# Patient Record
Sex: MALE | Race: WHITE | NOT HISPANIC OR LATINO | Employment: OTHER | URBAN - NONMETROPOLITAN AREA
[De-identification: names, ages, dates, MRNs, and addresses within clinical notes are randomized per-mention and may not be internally consistent; named-entity substitution may affect disease eponyms.]

---

## 2021-07-20 NOTE — PROGRESS NOTES
[facility] - PODIATRY    Today's Date: 07/28/21    Patient Name: Haseeb Joshua  MRN: [id number]  CSN: [id number]  PCP: Daniel Bennett DO  Referring Provider: No ref. provider found    SUBJECTIVE     Chief Complaint   Patient presents with   • Establish Care     Darkening of the left great toenail.  Pt states that the nail is also thick.  Unable to get a pulse ox reading. Pt has no pain.     HPI: Haseeb Joshua, a 75 y.o.male, comes to clinic as a(n) new patient presenting for diabetic foot exam and complaining of painful toenails. Patient has h/o CKD, diabetes, DVT, hypertension, hypothyroidism, CVA. Patient is IDDM and unsure of last BG level. States that blood glucose has typically been running less than 150. Presents with complaints of thickened, irregular toenail growth of the left hallux nail. Also notes that the left second nail is thick and irregular and that his wife is unable to cut it due to thickness. Denies any significant pain related to irregular toenail growth. Denies prior treatment. Denies pain. Denies previous treatment. Denies any constitutional symptoms. No other pedal complaints at this time.    Past Medical History:   Diagnosis Date   • Chronic kidney disease     stage 3   • Diabetes mellitus (CMS/HCC)    • DVT (deep venous thrombosis) (CMS/HCC)    • Hypertension    • Hypothyroidism    • Stroke (CMS/HCC) 09/09/2016    PCA distribution stroke, left     History reviewed. No pertinent surgical history.  History reviewed. No pertinent family history.  Social History     Socioeconomic History   • Marital status:      Spouse name: Not on file   • Number of children: Not on file   • Years of education: Not on file   • Highest education level: Not on file   Tobacco Use   • Smoking status: Never Smoker   • Smokeless tobacco: Never Used   Substance and Sexual Activity   • Alcohol use: No   • Drug use: No     No Known Allergies  Current Outpatient Medications   Medication Sig  Dispense Refill   • ACCU-CHEK ANITRA PLUS test strip      • atorvastatin (LIPITOR) 10 MG tablet Take 10 mg by mouth daily.     • insulin glargine (LANTUS, SEMGLEE) 100 UNIT/ML injection Inject  under the skin into the appropriate area as directed Daily.     • levothyroxine (SYNTHROID, LEVOTHROID) 100 MCG tablet Take 100 mcg by mouth daily.     • losartan (COZAAR) 100 MG tablet      • NovoLOG FlexPen 100 UNIT/ML solution pen-injector sc pen INJECT BEFORE MEALS 3 TIMES A DAY BASED ON SLIDING SCALE 5 10 UNITS.     • rivaroxaban (XARELTO) 20 MG tablet Take 20 mg by mouth daily.     • Arginine (PURE L-CITRULLINE) 600 MG capsule Take 600 mg by mouth daily.     • Arginine 500 MG capsule Take 500 mg by mouth 2 (two) times a day.     • ARIPiprazole (ABILIFY) 2 MG tablet      • calcitriol (ROCALTROL) 0.25 MCG capsule Take 0.25 mcg by mouth daily.     • glimepiride (AMARYL) 4 MG tablet Take 4 mg by mouth 2 (two) times a day.     • lisinopril (PRINIVIL,ZESTRIL) 20 MG tablet Take 20 mg by mouth daily.     • lovastatin (MEVACOR) 20 MG tablet      • mirtazapine (REMERON) 15 MG tablet        No current facility-administered medications for this visit.     Review of Systems   Constitutional: Negative for chills and fever.   HENT: Negative for congestion.    Respiratory: Negative for shortness of breath.    Cardiovascular: Negative for chest pain and leg swelling.   Gastrointestinal: Negative for constipation, diarrhea, nausea and vomiting.   Musculoskeletal: Positive for gait problem. Negative for arthralgias and myalgias.   Skin: Negative for wound.   Neurological: Negative for numbness.       OBJECTIVE     Vitals:    07/28/21 1050   BP: 114/68       PHYSICAL EXAM  GEN:   Accompanied by spouse     Foot/Ankle Exam:       General:   Diabetic Foot Exam Performed    Appearance: appears stated age and healthy    Orientation: AAOx3    Affect: appropriate    Gait: unimpaired    Assistance: independent    Shoe Gear:  Casual  shoes    VASCULAR      Right Foot Vascularity   Dorsalis pedis:  2+  Posterior tibial:  2+  Skin Temperature: warm    Edema Grading:  None  CFT:  3  Pedal Hair Growth:  Present  Varicosities: none       Left Foot Vascularity   Dorsalis pedis:  2+  Posterior tibial:  2+  Skin Temperature: warm    Edema Grading:  None  CFT:  3  Pedal Hair Growth:  Present  Varicosities: none        NEUROLOGIC     Right Foot Neurologic   Normal sensation    Light touch sensation:  Normal  Vibratory sensation:  Normal  Hot/Cold sensation: normal    Protective Sensation using [place]-Shelbi Monofilament:  10     Left Foot Neurologic   Normal sensation    Light touch sensation:  Normal  Vibratory sensation:  Normal  Hot/cold sensation: normal    Protective Sensation using [place]-Shelbi Monofilament:  10     MUSCULOSKELETAL      Right Foot Musculoskeletal   Ecchymosis:  None  Tenderness: none    Arch:  Normal     Left Foot Musculoskeletal   Ecchymosis:  None  Tenderness: none    Arch:  Normal     MUSCLE STRENGTH     Right Foot Muscle Strength   Foot dorsiflexion:  5  Foot plantar flexion:  5  Foot inversion:  5  Foot eversion:  5     Left Foot Muscle Strength   Foot dorsiflexion:  5  Foot plantar flexion:  5  Foot inversion:  5  Foot eversion:  5     RANGE OF MOTION      Right Foot Range of Motion   Foot and ankle ROM within normal limits       Left Foot Range of Motion   Foot and ankle ROM within normal limits       DERMATOLOGIC     Right Foot Dermatologic   Skin: skin intact       Left Foot Dermatologic   Skin: skin intact    Nails: onychomycosis and abnormally thick    Nails comment:  Onychogryphosis of hallux      RADIOLOGY/NUCLEAR:  No results found.    LABORATORY/CULTURE RESULTS:      PATHOLOGY RESULTS:       ASSESSMENT/PLAN     Diagnoses and all orders for this visit:    1. Onychogryphosis (Primary)    2. Type 2 diabetes mellitus without complication, with long-term current use of insulin (CMS/[facility])      Comprehensive lower  extremity examination and evaluation was performed.  Discussed findings and treatment plan including risks, benefits, and treatment options with patient in detail. Patient agreed with treatment plan.  After verbal consent obtained, nail(s) x2 debrided of length and thickness with nail nipper without incidence  Patient may maintain nails and calluses at home utilizing emery board or pumice stone between visits as needed  Reviewed at home diabetic foot care including daily foot checks   We will follow at a minimum on an annual basis for diabetic foot monitoring. Advised patient and spouse that if there are changes in foot health in the future including change in neuropathy that we would be able to follow on a more frequent basis for nail care and monitoring.  An After Visit Summary was printed and given to the patient at discharge, including (if requested) any available informative/educational handouts regarding diagnosis, treatment, or medications. All questions were answered to patient/family satisfaction. Should symptoms fail to improve or worsen they agree to call or return to clinic or to go to the Emergency Department. Discussed the importance of following up with any needed screening tests/labs/specialist appointments and any requested follow-up recommended by me today. Importance of maintaining follow-up discussed and patient accepts that missed appointments can delay diagnosis and potentially lead to worsening of conditions.  Return in about 1 year (around 7/28/2022)., or sooner if acute issues arise.        This document has been electronically signed by SANDRA Osuna on July 28, 2021 12:46 CDT

## 2021-07-27 ENCOUNTER — TELEPHONE (OUTPATIENT)
Dept: PODIATRY | Facility: CLINIC | Age: 76
End: 2021-07-27

## 2021-07-27 NOTE — TELEPHONE ENCOUNTER
Left message reminding patient of appointment with Allan Kennedy tomorrow.  Advised of time and location.

## 2021-07-28 ENCOUNTER — OFFICE VISIT (OUTPATIENT)
Dept: PODIATRY | Facility: CLINIC | Age: 76
End: 2021-07-28

## 2021-07-28 VITALS
DIASTOLIC BLOOD PRESSURE: 68 MMHG | BODY MASS INDEX: 28.76 KG/M2 | HEIGHT: 73 IN | SYSTOLIC BLOOD PRESSURE: 114 MMHG | WEIGHT: 217 LBS

## 2021-07-28 DIAGNOSIS — E11.9 TYPE 2 DIABETES MELLITUS WITHOUT COMPLICATION, WITH LONG-TERM CURRENT USE OF INSULIN (HCC): ICD-10-CM

## 2021-07-28 DIAGNOSIS — L60.2 ONYCHOGRYPHOSIS: Primary | ICD-10-CM

## 2021-07-28 DIAGNOSIS — Z79.4 TYPE 2 DIABETES MELLITUS WITHOUT COMPLICATION, WITH LONG-TERM CURRENT USE OF INSULIN (HCC): ICD-10-CM

## 2021-07-28 PROCEDURE — 99203 OFFICE O/P NEW LOW 30 MIN: CPT | Performed by: NURSE PRACTITIONER

## 2021-07-28 PROCEDURE — 11720 DEBRIDE NAIL 1-5: CPT | Performed by: NURSE PRACTITIONER

## 2021-07-28 RX ORDER — INSULIN GLARGINE 100 [IU]/ML
INJECTION, SOLUTION SUBCUTANEOUS DAILY
COMMUNITY

## 2021-07-28 RX ORDER — INSULIN ASPART 100 [IU]/ML
INJECTION, SOLUTION INTRAVENOUS; SUBCUTANEOUS
COMMUNITY
Start: 2021-05-28

## 2021-07-28 RX ORDER — LOSARTAN POTASSIUM 100 MG/1
TABLET ORAL
COMMUNITY
Start: 2021-05-27

## 2021-09-21 ENCOUNTER — HOSPITAL ENCOUNTER (OUTPATIENT)
Dept: LAB | Age: 76
Discharge: HOME | End: 2021-09-21
Payer: COMMERCIAL

## 2021-09-21 LAB
ALBUMIN SERPL-MCNC: 4.2 G/DL (ref 3.5–5.2)
ALP BLD-CCNC: 75 U/L (ref 40–130)
ALT SERPL-CCNC: 13 U/L (ref 5–41)
ANION GAP SERPL CALCULATED.3IONS-SCNC: 11 MMOL/L (ref 7–19)
AST SERPL-CCNC: 26 U/L (ref 5–40)
BASOPHILS ABSOLUTE: 0 K/UL (ref 0–0.2)
BASOPHILS RELATIVE PERCENT: 1 % (ref 0–1)
BILIRUB SERPL-MCNC: 0.5 MG/DL (ref 0.2–1.2)
BUN BLDV-MCNC: 53 MG/DL (ref 8–23)
CALCIUM SERPL-MCNC: 9.6 MG/DL (ref 8.8–10.2)
CHLORIDE BLD-SCNC: 108 MMOL/L (ref 98–111)
CO2: 22 MMOL/L (ref 22–29)
CREAT SERPL-MCNC: 2 MG/DL (ref 0.5–1.2)
CREATININE URINE: 88.6 MG/DL (ref 4.2–622)
EOSINOPHILS ABSOLUTE: 0.2 K/UL (ref 0–0.6)
EOSINOPHILS RELATIVE PERCENT: 4.5 % (ref 0–5)
GFR AFRICAN AMERICAN: 40
GFR NON-AFRICAN AMERICAN: 33
GLUCOSE BLD-MCNC: 108 MG/DL (ref 74–109)
HBA1C MFR BLD: 6.5 % (ref 4–6)
HCT VFR BLD CALC: 42.6 % (ref 42–52)
HEMOGLOBIN: 13.3 G/DL (ref 14–18)
IMMATURE GRANULOCYTES #: 0 K/UL
LYMPHOCYTES ABSOLUTE: 1 K/UL (ref 1.1–4.5)
LYMPHOCYTES RELATIVE PERCENT: 23.5 % (ref 20–40)
MCH RBC QN AUTO: 32.1 PG (ref 27–31)
MCHC RBC AUTO-ENTMCNC: 31.2 G/DL (ref 33–37)
MCV RBC AUTO: 102.9 FL (ref 80–94)
MICROALBUMIN UR-MCNC: <1.2 MG/DL (ref 0–19)
MICROALBUMIN/CREAT UR-RTO: NORMAL MG/G
MONOCYTES ABSOLUTE: 0.4 K/UL (ref 0–0.9)
MONOCYTES RELATIVE PERCENT: 9.5 % (ref 0–10)
NEUTROPHILS ABSOLUTE: 2.6 K/UL (ref 1.5–7.5)
NEUTROPHILS RELATIVE PERCENT: 61.3 % (ref 50–65)
PDW BLD-RTO: 12.3 % (ref 11.5–14.5)
PLATELET # BLD: 159 K/UL (ref 130–400)
PMV BLD AUTO: 11.3 FL (ref 9.4–12.4)
POTASSIUM SERPL-SCNC: 4.8 MMOL/L (ref 3.5–5)
RBC # BLD: 4.14 M/UL (ref 4.7–6.1)
SODIUM BLD-SCNC: 141 MMOL/L (ref 136–145)
TOTAL PROTEIN: 6.5 G/DL (ref 6.6–8.7)
VITAMIN B-12: 739 PG/ML (ref 211–946)
WBC # BLD: 4.2 K/UL (ref 4.8–10.8)

## 2021-09-22 LAB
SEX HORMONE BINDING GLOBULIN: 78 NMOL/L (ref 11–80)
TESTOSTERONE FREE PERCENT: 0.9 % (ref 1.6–2.9)
TESTOSTERONE FREE, CALC: 2 PG/ML (ref 47–244)
TESTOSTERONE TOTAL-MALE: 23 NG/DL (ref 300–720)

## 2022-05-12 ENCOUNTER — TRANSCRIBE ORDERS (OUTPATIENT)
Dept: ADMINISTRATIVE | Facility: HOSPITAL | Age: 77
End: 2022-05-12

## 2022-05-12 ENCOUNTER — LAB (OUTPATIENT)
Dept: LAB | Facility: HOSPITAL | Age: 77
End: 2022-05-12

## 2022-05-12 DIAGNOSIS — I69.30 SEQUELAE OF CEREBRAL INFARCTION: ICD-10-CM

## 2022-05-12 DIAGNOSIS — I51.9 MYXEDEMA HEART DISEASE: ICD-10-CM

## 2022-05-12 DIAGNOSIS — E03.9 MYXEDEMA HEART DISEASE: ICD-10-CM

## 2022-05-12 DIAGNOSIS — E11.40 DIABETIC NEUROPATHY WITH NEUROLOGIC COMPLICATION: ICD-10-CM

## 2022-05-12 DIAGNOSIS — Q53.10 UNILATERAL UNDESCENDED TESTICLE, UNSPECIFIED LOCATION: ICD-10-CM

## 2022-05-12 DIAGNOSIS — E11.49 DIABETIC NEUROPATHY WITH NEUROLOGIC COMPLICATION: ICD-10-CM

## 2022-05-12 DIAGNOSIS — I69.30 SEQUELAE OF CEREBRAL INFARCTION: Primary | ICD-10-CM

## 2022-05-12 DIAGNOSIS — E78.5 HYPERLIPIDEMIA, UNSPECIFIED HYPERLIPIDEMIA TYPE: ICD-10-CM

## 2022-05-12 LAB
ALBUMIN SERPL-MCNC: 4.2 G/DL (ref 3.5–5)
ALBUMIN/GLOB SERPL: 1.5 G/DL (ref 1.1–2.5)
ALP SERPL-CCNC: 62 U/L (ref 24–120)
ALT SERPL W P-5'-P-CCNC: 18 U/L (ref 0–50)
ANION GAP SERPL CALCULATED.3IONS-SCNC: 3 MMOL/L (ref 4–13)
AST SERPL-CCNC: 23 U/L (ref 7–45)
AUTO MIXED CELLS #: 0.6 10*3/MM3 (ref 0.1–2.6)
AUTO MIXED CELLS %: 14.5 % (ref 0.1–24)
BILIRUB SERPL-MCNC: 0.5 MG/DL (ref 0.1–1)
BUN SERPL-MCNC: 53 MG/DL (ref 5–21)
BUN/CREAT SERPL: 26
CALCIUM SPEC-SCNC: 9.5 MG/DL (ref 8.4–10.4)
CHLORIDE SERPL-SCNC: 112 MMOL/L (ref 98–110)
CHOLEST SERPL-MCNC: 122 MG/DL (ref 130–200)
CO2 SERPL-SCNC: 26 MMOL/L (ref 24–31)
CREAT SERPL-MCNC: 2.04 MG/DL (ref 0.5–1.4)
EGFRCR SERPLBLD CKD-EPI 2021: 33.4 ML/MIN/1.73
ERYTHROCYTE [DISTWIDTH] IN BLOOD BY AUTOMATED COUNT: 12.9 % (ref 12.3–15.4)
GLOBULIN UR ELPH-MCNC: 2.8 GM/DL
GLUCOSE SERPL-MCNC: 148 MG/DL (ref 70–100)
HBA1C MFR BLD: 6.5 % (ref 4.8–5.9)
HCT VFR BLD AUTO: 39.3 % (ref 37.5–51)
HDLC SERPL-MCNC: 64 MG/DL
HGB BLD-MCNC: 13.1 G/DL (ref 13–17.7)
LDLC SERPL CALC-MCNC: 44 MG/DL (ref 0–99)
LDLC/HDLC SERPL: 0.7 {RATIO}
LYMPHOCYTES # BLD AUTO: 1 10*3/MM3 (ref 0.7–3.1)
LYMPHOCYTES NFR BLD AUTO: 22.7 % (ref 19.6–45.3)
MCH RBC QN AUTO: 32.8 PG (ref 26.6–33)
MCHC RBC AUTO-ENTMCNC: 33.3 G/DL (ref 31.5–35.7)
MCV RBC AUTO: 98.5 FL (ref 79–97)
NEUTROPHILS NFR BLD AUTO: 2.8 10*3/MM3 (ref 1.7–7)
NEUTROPHILS NFR BLD AUTO: 62.8 % (ref 42.7–76)
PLATELET # BLD AUTO: 146 10*3/MM3 (ref 140–450)
PMV BLD AUTO: 9.5 FL (ref 6–12)
POTASSIUM SERPL-SCNC: 4.8 MMOL/L (ref 3.5–5.3)
PROT SERPL-MCNC: 7 G/DL (ref 6.3–8.7)
RBC # BLD AUTO: 3.99 10*6/MM3 (ref 4.14–5.8)
SODIUM SERPL-SCNC: 141 MMOL/L (ref 135–145)
TESTOST SERPL-MCNC: 157 NG/DL (ref 193–740)
TRIGL SERPL-MCNC: 65 MG/DL (ref 0–149)
TSH SERPL DL<=0.05 MIU/L-ACNC: 1.61 UIU/ML (ref 0.27–4.2)
VLDLC SERPL-MCNC: 14 MG/DL (ref 5–40)
WBC NRBC COR # BLD: 4.4 10*3/MM3 (ref 3.4–10.8)

## 2022-05-12 PROCEDURE — 82043 UR ALBUMIN QUANTITATIVE: CPT

## 2022-05-12 PROCEDURE — 85025 COMPLETE CBC W/AUTO DIFF WBC: CPT

## 2022-05-12 PROCEDURE — 80053 COMPREHEN METABOLIC PANEL: CPT | Performed by: INTERNAL MEDICINE

## 2022-05-12 PROCEDURE — 84443 ASSAY THYROID STIM HORMONE: CPT

## 2022-05-12 PROCEDURE — 36415 COLL VENOUS BLD VENIPUNCTURE: CPT | Performed by: INTERNAL MEDICINE

## 2022-05-12 PROCEDURE — 83036 HEMOGLOBIN GLYCOSYLATED A1C: CPT | Performed by: INTERNAL MEDICINE

## 2022-05-12 PROCEDURE — 84403 ASSAY OF TOTAL TESTOSTERONE: CPT

## 2022-05-12 PROCEDURE — 80061 LIPID PANEL: CPT

## 2022-05-12 PROCEDURE — 82570 ASSAY OF URINE CREATININE: CPT

## 2022-05-13 LAB
ALBUMIN UR-MCNC: 1.6 MG/DL
CREAT UR-MCNC: 124.8 MG/DL
MICROALBUMIN/CREAT UR: 12.8 MG/G

## 2022-07-25 NOTE — PROGRESS NOTES
[facility] - PODIATRY    Today's Date: 07/28/22    Patient Name: Haseeb Joshua  MRN: [id number]  CSN: [id number]  PCP: Daniel Bennett DO  Referring Provider: No ref. provider found    SUBJECTIVE     Chief Complaint   Patient presents with   • Follow-up      Daniel Bennett DO PCP05/12/2022 1 yr fu - pt states doing ok, no complaints - pt denies pain - pt presents 1 year follow up with yellow discolored nails, short thick discolored nails, cracking of  the left hallux    • Diabetes     170mg/dl this am      HPI: Haseeb Joshua, a 76 y.o.male, comes to clinic as a(n) established patient presenting for diabetic foot exam and complaining of painful toenails. Patient has h/o CKD, diabetes, DVT, hypertension, hypothyroidism, CVA. Patient is IDDM with last stated BG level of 170mg/dl. Presents with complaints of thickened, irregular toenail growth of nails of both feet. Denies any significant pain related to irregular toenail growth. Denies pain. Relates previous treatment(s) including care by podiatry. Denies any constitutional symptoms. No other pedal complaints at this time.    Past Medical History:   Diagnosis Date   • Chronic kidney disease     stage 3   • Diabetes mellitus (HCC)    • DVT (deep venous thrombosis) (HCC)    • Hypertension    • Hypothyroidism    • Stroke (HCC) 09/09/2016    PCA distribution stroke, left     History reviewed. No pertinent surgical history.  History reviewed. No pertinent family history.  Social History     Socioeconomic History   • Marital status:    Tobacco Use   • Smoking status: Never Smoker   • Smokeless tobacco: Never Used   Substance and Sexual Activity   • Alcohol use: No   • Drug use: No     No Known Allergies  Current Outpatient Medications   Medication Sig Dispense Refill   • ACCU-CHEK ANITRA PLUS test strip      • Arginine 600 MG capsule Take 600 mg by mouth daily.     • ARIPiprazole (ABILIFY) 2 MG tablet      • atorvastatin (LIPITOR) 10 MG tablet Take  10 mg by mouth daily.     • calcitriol (ROCALTROL) 0.25 MCG capsule Take 0.25 mcg by mouth daily.     • glimepiride (AMARYL) 4 MG tablet Take 4 mg by mouth 2 (two) times a day.     • insulin glargine (LANTUS, SEMGLEE) 100 UNIT/ML injection Inject  under the skin into the appropriate area as directed Daily.     • levothyroxine (SYNTHROID, LEVOTHROID) 100 MCG tablet Take 100 mcg by mouth daily.     • lisinopril (PRINIVIL,ZESTRIL) 20 MG tablet Take 20 mg by mouth daily.     • losartan (COZAAR) 100 MG tablet      • lovastatin (MEVACOR) 20 MG tablet      • mirtazapine (REMERON) 15 MG tablet      • NovoLOG FlexPen 100 UNIT/ML solution pen-injector sc pen      • rivaroxaban (XARELTO) 20 MG tablet Take 20 mg by mouth daily.     • Arginine 500 MG capsule Take 500 mg by mouth 2 (two) times a day.       No current facility-administered medications for this visit.     Review of Systems   Constitutional: Negative for chills and fever.   HENT: Negative for congestion.    Respiratory: Negative for shortness of breath.    Cardiovascular: Negative for chest pain and leg swelling.   Gastrointestinal: Negative for constipation, diarrhea, nausea and vomiting.   Musculoskeletal: Positive for gait problem. Negative for arthralgias and myalgias.   Skin: Negative for wound.        Thickened nails   Neurological: Negative for numbness.       OBJECTIVE     Vitals:    07/27/22 1109   BP: 120/70       PHYSICAL EXAM  GEN:   Accompanied by spouse     Foot/Ankle Exam:       General:   Diabetic Foot Exam Performed    Appearance: appears stated age and healthy    Orientation: AAOx3    Affect: appropriate    Gait: unimpaired    Assistance: independent    Shoe Gear:  Casual shoes    VASCULAR      Right Foot Vascularity   Dorsalis pedis:  2+  Posterior tibial:  2+  Skin Temperature: warm    Edema Grading:  None  CFT:  3  Pedal Hair Growth:  Present  Varicosities: none       Left Foot Vascularity   Dorsalis pedis:  2+  Posterior tibial:  2+  Skin  Temperature: warm    Edema Grading:  None  CFT:  3  Pedal Hair Growth:  Present  Varicosities: none        NEUROLOGIC     Right Foot Neurologic   Normal sensation    Light touch sensation:  Normal  Vibratory sensation:  Normal  Hot/Cold sensation: normal    Protective Sensation using [place]-Shelbi Monofilament:  10     Left Foot Neurologic   Normal sensation    Light touch sensation:  Normal  Vibratory sensation:  Normal  Hot/cold sensation: normal    Protective Sensation using [place]-Shelbi Monofilament:  10     MUSCULOSKELETAL      Right Foot Musculoskeletal   Ecchymosis:  None  Tenderness: none    Arch:  Normal     Left Foot Musculoskeletal   Ecchymosis:  None  Tenderness: none    Arch:  Normal     MUSCLE STRENGTH     Right Foot Muscle Strength   Foot dorsiflexion:  5  Foot plantar flexion:  5  Foot inversion:  5  Foot eversion:  5     Left Foot Muscle Strength   Foot dorsiflexion:  5  Foot plantar flexion:  5  Foot inversion:  5  Foot eversion:  5     RANGE OF MOTION      Right Foot Range of Motion   Foot and ankle ROM within normal limits       Left Foot Range of Motion   Foot and ankle ROM within normal limits       DERMATOLOGIC     Right Foot Dermatologic   Skin: skin intact    Nails: onychomycosis and abnormally thick       Left Foot Dermatologic   Skin: skin intact    Nails: onychomycosis and abnormally thick    Nails comment:  Onychogryphosis of hallux      RADIOLOGY/NUCLEAR:  No results found.    LABORATORY/CULTURE RESULTS:      PATHOLOGY RESULTS:       ASSESSMENT/PLAN     Diagnoses and all orders for this visit:    1. Onychogryphosis (Primary)    2. Type 2 diabetes mellitus without complication, with long-term current use of insulin (HCC)      Comprehensive lower extremity examination and evaluation was performed.  Discussed findings and treatment plan including risks, benefits, and treatment options with patient in detail. Patient agreed with treatment plan.  DFE performed.   After verbal consent  obtained, nail(s) x2 debrided of length and thickness with nail nipper without incidence  Patient may maintain nails and calluses at home utilizing emery board or pumice stone between visits as needed  Reviewed at home diabetic foot care including daily foot checks   Continue 1 year follow-ups.   An After Visit Summary was printed and given to the patient at discharge, including (if requested) any available informative/educational handouts regarding diagnosis, treatment, or medications. All questions were answered to patient/family satisfaction. Should symptoms fail to improve or worsen they agree to call or return to clinic or to go to the Emergency Department. Discussed the importance of following up with any needed screening tests/labs/specialist appointments and any requested follow-up recommended by me today. Importance of maintaining follow-up discussed and patient accepts that missed appointments can delay diagnosis and potentially lead to worsening of conditions.  Return in about 1 year (around 7/27/2023) for Annual Diabetic Foot Exam., or sooner if acute issues arise.        This document has been electronically signed by SANDRA Osuna on July 28, 2022 12:34 CDT

## 2022-07-27 ENCOUNTER — OFFICE VISIT (OUTPATIENT)
Dept: PODIATRY | Facility: CLINIC | Age: 77
End: 2022-07-27

## 2022-07-27 VITALS
HEIGHT: 73 IN | BODY MASS INDEX: 28.76 KG/M2 | DIASTOLIC BLOOD PRESSURE: 70 MMHG | WEIGHT: 217 LBS | SYSTOLIC BLOOD PRESSURE: 120 MMHG

## 2022-07-27 DIAGNOSIS — L60.2 ONYCHOGRYPHOSIS: Primary | ICD-10-CM

## 2022-07-27 DIAGNOSIS — Z79.4 TYPE 2 DIABETES MELLITUS WITHOUT COMPLICATION, WITH LONG-TERM CURRENT USE OF INSULIN: ICD-10-CM

## 2022-07-27 DIAGNOSIS — E11.9 TYPE 2 DIABETES MELLITUS WITHOUT COMPLICATION, WITH LONG-TERM CURRENT USE OF INSULIN: ICD-10-CM

## 2022-07-27 PROCEDURE — 11721 DEBRIDE NAIL 6 OR MORE: CPT | Performed by: NURSE PRACTITIONER

## 2022-08-18 ENCOUNTER — TRANSCRIBE ORDERS (OUTPATIENT)
Dept: ADMINISTRATIVE | Facility: HOSPITAL | Age: 77
End: 2022-08-18

## 2022-08-18 ENCOUNTER — LAB (OUTPATIENT)
Dept: LAB | Facility: HOSPITAL | Age: 77
End: 2022-08-18

## 2022-08-18 DIAGNOSIS — E11.21 DIABETIC GLOMERULOPATHY: Primary | ICD-10-CM

## 2022-08-18 LAB
ALBUMIN SERPL-MCNC: 4.1 G/DL (ref 3.5–5)
ALBUMIN/GLOB SERPL: 1.4 G/DL (ref 1.1–2.5)
ALP SERPL-CCNC: 62 U/L (ref 24–120)
ALT SERPL W P-5'-P-CCNC: 34 U/L (ref 0–50)
ANION GAP SERPL CALCULATED.3IONS-SCNC: 2 MMOL/L (ref 4–13)
AST SERPL-CCNC: 32 U/L (ref 7–45)
AUTO MIXED CELLS #: 0.6 10*3/MM3 (ref 0.1–2.6)
AUTO MIXED CELLS %: 12.9 % (ref 0.1–24)
BILIRUB SERPL-MCNC: 0.6 MG/DL (ref 0.1–1)
BUN SERPL-MCNC: 40 MG/DL (ref 5–21)
BUN/CREAT SERPL: 20.7
CALCIUM SPEC-SCNC: 9.2 MG/DL (ref 8.4–10.4)
CHLORIDE SERPL-SCNC: 112 MMOL/L (ref 98–110)
CO2 SERPL-SCNC: 28 MMOL/L (ref 24–31)
CREAT SERPL-MCNC: 1.93 MG/DL (ref 0.5–1.4)
EGFRCR SERPLBLD CKD-EPI 2021: 35.7 ML/MIN/1.73
ERYTHROCYTE [DISTWIDTH] IN BLOOD BY AUTOMATED COUNT: 12.2 % (ref 12.3–15.4)
GLOBULIN UR ELPH-MCNC: 3 GM/DL
GLUCOSE SERPL-MCNC: 120 MG/DL (ref 70–100)
HBA1C MFR BLD: 6.8 % (ref 4.8–5.9)
HCT VFR BLD AUTO: 41.1 % (ref 37.5–51)
HGB BLD-MCNC: 13.6 G/DL (ref 13–17.7)
LYMPHOCYTES # BLD AUTO: 1.4 10*3/MM3 (ref 0.7–3.1)
LYMPHOCYTES NFR BLD AUTO: 32.8 % (ref 19.6–45.3)
MCH RBC QN AUTO: 32.1 PG (ref 26.6–33)
MCHC RBC AUTO-ENTMCNC: 33.1 G/DL (ref 31.5–35.7)
MCV RBC AUTO: 96.9 FL (ref 79–97)
NEUTROPHILS NFR BLD AUTO: 2.3 10*3/MM3 (ref 1.7–7)
NEUTROPHILS NFR BLD AUTO: 54.3 % (ref 42.7–76)
PLATELET # BLD AUTO: 149 10*3/MM3 (ref 140–450)
PMV BLD AUTO: 9.6 FL (ref 6–12)
POTASSIUM SERPL-SCNC: 4.5 MMOL/L (ref 3.5–5.3)
PROT SERPL-MCNC: 7.1 G/DL (ref 6.3–8.7)
RBC # BLD AUTO: 4.24 10*6/MM3 (ref 4.14–5.8)
SODIUM SERPL-SCNC: 142 MMOL/L (ref 135–145)
WBC NRBC COR # BLD: 4.3 10*3/MM3 (ref 3.4–10.8)

## 2022-08-18 PROCEDURE — 36415 COLL VENOUS BLD VENIPUNCTURE: CPT | Performed by: INTERNAL MEDICINE

## 2022-08-18 PROCEDURE — 85025 COMPLETE CBC W/AUTO DIFF WBC: CPT | Performed by: INTERNAL MEDICINE

## 2022-08-18 PROCEDURE — 83036 HEMOGLOBIN GLYCOSYLATED A1C: CPT | Performed by: INTERNAL MEDICINE

## 2022-08-18 PROCEDURE — 80053 COMPREHEN METABOLIC PANEL: CPT | Performed by: INTERNAL MEDICINE

## 2022-08-29 ENCOUNTER — TELEPHONE (OUTPATIENT)
Dept: NEUROLOGY | Age: 77
End: 2022-08-29

## 2022-08-29 LAB
ALBUMIN SERPL-MCNC: 3.8 G/DL (ref 3.5–5.2)
ALP BLD-CCNC: 81 U/L (ref 40–130)
ALT SERPL-CCNC: 19 U/L (ref 5–41)
ANION GAP SERPL CALCULATED.3IONS-SCNC: 11 MMOL/L (ref 7–19)
AST SERPL-CCNC: 24 U/L (ref 5–40)
BASOPHILS ABSOLUTE: 0 K/UL (ref 0–0.2)
BASOPHILS RELATIVE PERCENT: 0.6 % (ref 0–1)
BILIRUB SERPL-MCNC: 0.4 MG/DL (ref 0.2–1.2)
BUN BLDV-MCNC: 31 MG/DL (ref 8–23)
CALCIUM SERPL-MCNC: 9.4 MG/DL (ref 8.8–10.2)
CHLORIDE BLD-SCNC: 104 MMOL/L (ref 98–111)
CO2: 25 MMOL/L (ref 22–29)
CREAT SERPL-MCNC: 1.9 MG/DL (ref 0.5–1.2)
EOSINOPHILS ABSOLUTE: 0.1 K/UL (ref 0–0.6)
EOSINOPHILS RELATIVE PERCENT: 2 % (ref 0–5)
GFR AFRICAN AMERICAN: 42
GFR NON-AFRICAN AMERICAN: 35
GLUCOSE BLD-MCNC: 179 MG/DL (ref 74–109)
HCT VFR BLD CALC: 42.3 % (ref 42–52)
HEMOGLOBIN: 13.6 G/DL (ref 14–18)
IMMATURE GRANULOCYTES #: 0 K/UL
LYMPHOCYTES ABSOLUTE: 1.2 K/UL (ref 1.1–4.5)
LYMPHOCYTES RELATIVE PERCENT: 25 % (ref 20–40)
MAGNESIUM: 2 MG/DL (ref 1.6–2.4)
MCH RBC QN AUTO: 32.7 PG (ref 27–31)
MCHC RBC AUTO-ENTMCNC: 32.2 G/DL (ref 33–37)
MCV RBC AUTO: 101.7 FL (ref 80–94)
MONOCYTES ABSOLUTE: 0.4 K/UL (ref 0–0.9)
MONOCYTES RELATIVE PERCENT: 8.1 % (ref 0–10)
NEUTROPHILS ABSOLUTE: 3.2 K/UL (ref 1.5–7.5)
NEUTROPHILS RELATIVE PERCENT: 64.1 % (ref 50–65)
PARATHYROID HORMONE INTACT: 131.4 PG/ML (ref 15–65)
PDW BLD-RTO: 12.4 % (ref 11.5–14.5)
PHOSPHORUS: 2.9 MG/DL (ref 2.5–4.5)
PLATELET # BLD: 144 K/UL (ref 130–400)
PMV BLD AUTO: 10.3 FL (ref 9.4–12.4)
POTASSIUM SERPL-SCNC: 4.6 MMOL/L (ref 3.5–5)
RBC # BLD: 4.16 M/UL (ref 4.7–6.1)
SODIUM BLD-SCNC: 140 MMOL/L (ref 136–145)
TOTAL PROTEIN: 6.6 G/DL (ref 6.6–8.7)
URIC ACID, SERUM: 7.3 MG/DL (ref 3.4–7)
VITAMIN D 25-HYDROXY: 54.6 NG/ML
WBC # BLD: 4.9 K/UL (ref 4.8–10.8)

## 2022-08-31 LAB — ANA IGG, ELISA: NORMAL

## 2022-09-01 LAB
+IMM: ABNORMAL
ALBUMIN SERPL-MCNC: 3.73 G/DL (ref 3.75–5.01)
ALPHA-1-GLOBULIN: 0.3 G/DL (ref 0.19–0.46)
ALPHA-2-GLOBULIN: 0.73 G/DL (ref 0.48–1.05)
BETA GLOBULIN: 0.74 G/DL (ref 0.48–1.1)
C3 COMPLEMENT: 99 MG/DL (ref 90–180)
C4 COMPLEMENT: 23 MG/DL (ref 10–40)
GAMMA GLOBULIN: 0.9 G/DL (ref 0.62–1.51)
IGA: 233 MG/DL (ref 68–408)
IGG: 881 MG/DL (ref 768–1632)
IGM: 69 MG/DL (ref 35–263)
KAPPA FREE LIGHT CHAINS QNT: 44.71 MG/L (ref 3.3–19.4)
KAPPA/LAMBDA FREE LIGHT CHAIN RATIO: 1.96 (ref 0.26–1.65)
LAMBDA FREE LIGHT CHAINS QNT: 22.77 MG/L (ref 5.71–26.3)
SPE/IFE INTERPRETATION: ABNORMAL
TOTAL PROTEIN: 6.4 G/DL (ref 6.3–8.2)

## 2022-09-14 ENCOUNTER — TELEPHONE (OUTPATIENT)
Dept: NEUROLOGY | Age: 77
End: 2022-09-14

## 2022-09-14 NOTE — TELEPHONE ENCOUNTER
Received a referral from Dr. Cosmo Washington office for this patient.  Called cell # and left a VM for patient to call our office to where we can get him scheduled an appointment with ERASMO Hardin.

## 2022-09-15 ENCOUNTER — HOSPITAL ENCOUNTER (OUTPATIENT)
Dept: ULTRASOUND IMAGING | Age: 77
Discharge: HOME | End: 2022-09-15
Payer: COMMERCIAL

## 2022-09-15 DIAGNOSIS — N18.32 STAGE 3B CHRONIC KIDNEY DISEASE (HCC): ICD-10-CM

## 2022-09-15 PROCEDURE — 76770 US EXAM ABDO BACK WALL COMP: CPT

## 2022-09-15 PROCEDURE — 76770 US EXAM ABDO BACK WALL COMP: CPT | Performed by: RADIOLOGY

## 2022-09-21 LAB
BILIRUBIN URINE: NEGATIVE
BLOOD, URINE: NEGATIVE
CLARITY: CLEAR
COLOR: YELLOW
CREATININE URINE: 52.6 MG/DL (ref 4.2–622)
GLUCOSE URINE: NEGATIVE MG/DL
KETONES, URINE: NEGATIVE MG/DL
LEUKOCYTE ESTERASE, URINE: NEGATIVE
NITRITE, URINE: NEGATIVE
PH UA: 5.5 (ref 5–8)
PROTEIN PROTEIN: <4 MG/DL (ref 15–45)
PROTEIN UA: NEGATIVE MG/DL
SPECIFIC GRAVITY UA: 1.01 (ref 1–1.03)
UROBILINOGEN, URINE: 0.2 E.U./DL

## 2022-10-12 ENCOUNTER — OFFICE VISIT (OUTPATIENT)
Dept: NEUROLOGY | Age: 77
End: 2022-10-12
Payer: COMMERCIAL

## 2022-10-12 VITALS
SYSTOLIC BLOOD PRESSURE: 120 MMHG | WEIGHT: 230 LBS | HEART RATE: 68 BPM | OXYGEN SATURATION: 97 % | BODY MASS INDEX: 30.48 KG/M2 | DIASTOLIC BLOOD PRESSURE: 71 MMHG | HEIGHT: 73 IN

## 2022-10-12 DIAGNOSIS — T88.8XXS: ICD-10-CM

## 2022-10-12 DIAGNOSIS — R40.0 SOMNOLENCE, DAYTIME: ICD-10-CM

## 2022-10-12 DIAGNOSIS — G47.00 INSOMNIA, UNSPECIFIED TYPE: ICD-10-CM

## 2022-10-12 DIAGNOSIS — G47.33 OBSTRUCTIVE SLEEP APNEA: Primary | ICD-10-CM

## 2022-10-12 PROBLEM — Z78.9 DIFFICULTY WITH CPAP USE: Status: ACTIVE | Noted: 2022-10-12

## 2022-10-12 PROCEDURE — 1123F ACP DISCUSS/DSCN MKR DOCD: CPT | Performed by: PHYSICIAN ASSISTANT

## 2022-10-12 PROCEDURE — 99204 OFFICE O/P NEW MOD 45 MIN: CPT | Performed by: PHYSICIAN ASSISTANT

## 2022-10-12 RX ORDER — INSULIN ASPART 100 [IU]/ML
INJECTION, SOLUTION INTRAVENOUS; SUBCUTANEOUS
COMMUNITY
Start: 2021-05-28

## 2022-10-12 RX ORDER — LOSARTAN POTASSIUM 100 MG/1
TABLET ORAL
COMMUNITY
Start: 2021-05-27

## 2022-10-12 RX ORDER — INSULIN GLARGINE 100 [IU]/ML
INJECTION, SOLUTION SUBCUTANEOUS
COMMUNITY
Start: 2022-08-04

## 2022-10-12 RX ORDER — LEVOTHYROXINE SODIUM 0.1 MG/1
100 TABLET ORAL DAILY
COMMUNITY

## 2022-10-12 RX ORDER — ESCITALOPRAM OXALATE 20 MG/1
TABLET ORAL
COMMUNITY
Start: 2022-08-04

## 2022-10-12 RX ORDER — GLIMEPIRIDE 4 MG/1
4 TABLET ORAL
COMMUNITY

## 2022-10-12 RX ORDER — ATORVASTATIN CALCIUM 40 MG/1
40 TABLET, FILM COATED ORAL DAILY
COMMUNITY
Start: 2021-05-27

## 2022-10-12 RX ORDER — INSULIN GLARGINE 100 [IU]/ML
20 INJECTION, SOLUTION SUBCUTANEOUS 2 TIMES DAILY
COMMUNITY
Start: 2020-12-16

## 2022-10-12 NOTE — PROGRESS NOTES
[facility] Neurology and Sleep Medicine  [address] [place] [address] Drive, [address]  [place], [address]  Phone [phone]  Fax [phone] SLEEP    Patient: Luli Rushing  :  1945  Age:  77 y.o. MRN:  [id number]  Today: 10/12/22    Provider:  Wilfred Hernandez PA-C    Chief Complaint:  Chief Complaint   Patient presents with    New Patient     sleep       History Source: History obtained from caregiver/friend, chart review, and the patient. PCP: Lorelei Mcdowell DO     Referring Provider: DO Michael Moffett 143  Luz Miranda 7    HISTORY OF PRESENT ILLNESS:   Luli Rushing is a 77y.o. year old male with a history of MARYANA who was referred for a sleep evaluation. Today his caregiver reports that he was diagnosed with MARYANA years ago. He was prescribed CPAP. He no longer uses it. He had technical problems with it. It malfunctioned. His bedtime is 9:00 pm and averages 12 hours of sleep per night. He sometimes has difficulty initiating sleep but no difficulty maintaining sleep. His sleep is non-restorative. It is hard to get going. He sometimes takes naps. The naps are not refreshing. He does not awaken with a gasp. He has gained weight since the initial sleep study. PAST MEDICAL HISTORY:    Medical History:  Past Medical History:   Diagnosis Date    Depression     Diabetes ([facility] [address]     Hyperlipidemia     Hypersomnia     Hypertension     Mild cognitive impairment     MARYANA (obstructive sleep apnea)     Osteoarthritis     Stage 3 chronic kidney disease ([facility] [address]     Stroke [facility])     Vascular disease        Surgical History:  No past surgical history on file.     Current Medications:  Current Outpatient Medications   Medication Sig Dispense Refill    rivaroxaban (XARELTO) 20 MG TABS tablet Take 20 mg by mouth daily      losartan (COZAAR) 100 MG tablet       levothyroxine (SYNTHROID) 100 MCG tablet Take 100 mcg by mouth daily      LANTUS SOLOSTAR 100 UNIT/ML injection pen       insulin glargine (LANTUS SOLOSTAR) 100 UNIT/ML injection pen Inject 20 Units into the skin 2 times daily      escitalopram (LEXAPRO) 20 MG tablet       atorvastatin (LIPITOR) 40 MG tablet Take 40 mg by mouth daily      insulin aspart (NOVOLOG FLEXPEN) 100 UNIT/ML injection pen       glimepiride (AMARYL) 4 MG tablet Take 4 mg by mouth       No current facility-administered medications for this visit. Allergies:  Latex    SOCIAL HISTORY:   Social History     Substance and Sexual Activity   Alcohol Use Not Currently     Social History     Substance and Sexual Activity   Drug Use Never     Social History     Tobacco Use   Smoking Status Never   Smokeless Tobacco Never       FAMILY HISTORY:   No family history on file. REVIEW OF SYSTEMS     Constitutional: []Fever []Sweats []Chills [] Recent Injury   [x] Denies all unless marked  HENT:[]Headache  [] Head Injury  [] Sore Throat  [] Ear Pain  [] Dizziness [] Hearing Loss   [x] Denies all unless marked  Musculoskeletal: [] Arthralgia  [] Myalgias [] Muscle cramps  [] Muscle twitches   [x] Denies all unless marked   Spine:  [] Neck pain  [] Back pain  [] Sciaticia  [x] Denies all unless marked  Neurological:[] Visual Disturbance [] Double Vision [] Slurred Speech [] Trouble swallowing  [] Vertigo [] Tingling [] Numbness [] Weakness [] Loss of Balance   [] Loss of Consciousness [] Memory Loss [] Seizures  [x] Denies all unless marked  Psychiatric/Behavioral:[] Depression [] Anxiety  [x] Denies all unless marked  Sleep: [x]  Insomnia [x] Sleep Disturbance [] Snoring [] Restless Legs [x] Daytime Sleepiness [x] Sleep Apnea  [] Denies all unless marked      The MA has completed the ROS with the patient. I have reviewed it in its' entirety with the patient and agree with the documentation.        PHYSICAL EXAM  /71   Pulse 68   Ht 6' 1\" (1.854 m)   Wt 230 lb (104.3 kg)   SpO2 97%   BMI 30.34 kg/m²    Neck circumference:  17 inches  Mallampati: Type 4  Constitutional -  Alert in NAD, well developed, pleasant and cooperative with exam  HEENT- Conjunctiva normal.  No scars, masses, or lesions over external nose or ears, hearing intact, no neck masses noted, no jugular vein distension, no bruit  Cardiac- Regular rate and rhythm  Pulmonary- Clear to auscultation, good expansion, normal effort without use of accessory muscles  Musculoskeletal - No significant wasting of muscles noted, no bony deformities  Extremities - No clubbing, cyanosis or edema  Skin - Warm, dry, and intact. No rash, erythema, or pallor  Psychiatric - Mood, affect, and behavior appear normal      Neurological exam  Awake, alert, fluent oriented x 3 appropriate affect  Attention and concentration appear appropriate  Recent and remote memory appears unremarkable  Speech normal without dysarthria  No clear issues with language of fund of knowledge    Cranial Nerve Exam   CN II- Visual fields grossly unremarkable  CN III, IV,VI-EOMI, No nystagmus, conjugate eye movements, no ptosis  CN VII-No facial assymetry  CN VIII-Hearing  CN IX and X- No palate asymmetry  CN XI-Shoulder shrug intact  CN XII-Tongue midline with no fasciculations or fibrillations    Motor Exam  V/V throughout upper and lower extremities bilaterally, no cogwheeling, normal tone    Tremors  No tremors in hands or head noted    Coordination  Finger to nose unremarkable   MARK unremarkable    Gait  Normal base and speed  No ataxia    I reviewed the following studies:       []  :  Clinical laboratory test results     []  :  Radiology reports                    [x]  :  Review and summarization of medical records-referring provider, Gema Hanley MD     []     Request for medical records       []  :  Reviewed previous/recent polysomnogram report(s)      []  :  [place] Sleepiness Scale        IMPRESSION:    ICD-10-CM    1. Obstructive sleep apnea  G47.33 Sleep Study with PAP Titration     [facility]      2.  Somnolence, daytime  R40.0 Sleep Study with PAP Titration [address]      3. Insomnia, unspecified type  G47.00 Sleep Study with PAP Titration     [address]      4. Malfunction of continuous positive airway pressure (CPAP) or bilevel positive airway pressure (BPAP) machine, sequela  T88. 8XXS Sleep Study with PAP Titration     [address]           PLAN:  1. Orders Placed This Encounter   Procedures    [address]    Sleep Study with PAP Titration       2. Patient advised of the etiology,  pathophysiology, diagnosis, treatment options, and risks of untreated MARYANA. Risks may include, but are not limited to  hypertension, coronary artery disease, atrial fibrillation, CHF, diabetes, stroke, weight gain, impaired cognition, daytime somnolence,  and motor vehicle accidents. Advised to abstain from driving or operating heavy machinery when drowsy and the use of respiratory suppressants. Counseled on multimodal approach to treatment of sleep apnea to include but not limited to diet, exercise, sleep hygiene, and compliance with pap therapy. 3.  We had a discussion about the clinical issues and went over the various important aspects to consider. Treatment plan and potential diagnostic studies were discussed. All questions were answered. Pt voiced understanding and agrees with treatment plan. 4. The following educational material has been included in this visit after visit summary for your review:  MARYANA/PAP guidelines/sleep studies/CPAP-discussed with pt.  5.  If pt requires a PAP device he will be required to be evaluated for clinical benefit and  compliance during a 30 day period within the preceding 90 days of set up. 6.  Order-split-night PSG  7.   Follow up per protocol

## 2022-10-12 NOTE — PATIENT INSTRUCTIONS

## 2022-12-05 ENCOUNTER — HOSPITAL ENCOUNTER (OUTPATIENT)
Dept: SLEEP CENTER | Age: 77
Discharge: HOME | End: 2022-12-07
Payer: COMMERCIAL

## 2022-12-05 PROCEDURE — 95810 POLYSOM 6/> YRS 4/> PARAM: CPT

## 2022-12-06 NOTE — PROGRESS NOTES
[place] [address]  [place], [address]  Phone [phone] Fax [phone]     Sleep Study Technician Review    Patient Name:  Maxine Hernandez  :   1945  Referring Provider: ERASMO Kyle    Brief History:  Maxine Hernandez is a 69 y.o. Male with a history of MARYANA who was referred for a sleep evaluation. Today his caregiver reports that he was diagnosed with MARYANA years ago. He was prescribed CPAP. He no longer uses it. He had technical problems with it. It malfunctioned. His bedtime is 9:00 pm and averages 12 hours of sleep per night. He sometimes has difficulty initiating sleep but no difficulty maintaining sleep. His sleep is non-restorative. It is hard to get going. He sometimes takes naps. The naps are not refreshing. He does not awaken with a gasp. He has gained weight since the initial sleep study. Height:  73\"  Weight: 230 lbs  BMI: 30.34  Neck Circ: 17\"  Mallampati 4  ESS: 9    Type of Study: PSG  Time Stage Position Snore Hypopnea Obs Apnea Daphnie Apnea PAP O2   2200 Awake Supine No No No No  RA   2300 Awake Supine No No No No  RA   2400 Awake Supine No No No No  RA   0100 1 Supine No Yes No No  RA   0200 2 Supine Yes Yes No Yes  RA   0300 Awake Supine No No No No  RA   0400 2 Left Yes Yes No Yes  RA     Summary: Patient did not qualify for a split study so a PSG only was performed for the patient. Patient had no complaints throughout the night. DME: [facility] [employer]     The study was reviewed briefly with Maxine Hernandez. He will be notified of the formal results and recommendations after the study is scored and interpreted. The report will be sent to his referring provider.     Technician: Yolie MCDUFFIE [facility] [place] RRT, RPSGT

## 2023-01-02 DIAGNOSIS — G47.33 OBSTRUCTIVE SLEEP APNEA: Primary | ICD-10-CM

## 2023-01-02 NOTE — PROCEDURES
[place] [address]  [place], [address]  Phone [phone] Fax [phone]     Polysomnography Report  Patient Name Afsaneh Brown Account Number [de-identified]    1945 Referring Provider Darin Cárdenas,    Age/ Gender 69 years/M Interpreting physician Umm Polo M.D., [facility]   Neck circumference/  Mallampati classification 17.0 in/class 4 Night Technician Rosemarie Blackburn RRT, RPSGT   [place] score  Scoring Technician Rosemarie Blackburn, RRT,RPSGT   Height 73.0 in Indications for the test excessive daytime somnolence   Weight 230.0 lbs Test Diagnostic Polysomnogram   BMI 30.3 Date of test 2022     Procedure  A Diagnostic Polysomnogram was conducted on the night of 2022. The study was performed and scored per AASM guidelines. The following were monitored: frontal, central, and occipital EEG, electrooculogram (EOG), submentalis EMG, nasal and oral airflow, intranasal pressure, thoracic plethysmography, abdominal plethysmography, anterior tibialis EMG, electrocardiogram, body position, and positive airway pressure (PAP). Arterial oxygen saturation was monitored with a pulse oximeter. The study was scored utilizing 30 second epochs. Hypopneas were scored using per AASM definition VIII, D, 1B.     Sleep Scoring Data  Lights out 8:43:21 PM Sleep latency 14.3 min Time in N1 95.0 min N1% 29.6%   Lights on 4:58:09 AM WASO 160.0 min Time in N2 160.0 min N2% 49.9%   TIB/.8 min Sleep efficiency 66.7% Time in N3 0.0 min N3% 0.0%   .5 min REM latency 133.0 min Time in R 65.5 min R% 20.4%     Respiratory Events Summary   NREM REM Total   Hypopnea index 5.6 10.1 6.6   Apnea index 15.8 15.6 15.7   RERA index 0.0 0.0 0.0   AHI 21.4 25.6 22.3   RDI (AHI + RERA index) 21.4 25.6 22.3     Respiratory Events by Sleep Stage   Obstructive Apneas OA Index Central Apneas CA Index Mixed Apneas MA Index   Hypopneas H Index   RERAs   R Index   NREM 67 15.8 0 0.0 0 0.0 24 5.6 0 0.0   REM 16 14.7 0 0.0 1 0.9 11 10.1 0 0.0   Total 83 15.5 0 0.0 1 0.2 35 6.6 0 0.0     Respiratory Events by Body Position   Time (min) Obstructive Apneas OA Index Central Apneas CA Index Mixed Apneas MA Index   Hypopneas H Index   RERAs RERA  Index Total  AHI Total RDI   Supine 394.9  43 10.4 0 0.0 1 0.2 33 8.0 0 0.0 18.6 18.6   R/Supine                                           L/Supine                                           Right                                           Left 80.0  40 33.3 0 0.0 0 0.0 2 1.7 0 0.0 35.0 35.0   Prone                                           R/Prone                                           L/Prone                                           Up 0.4  0 0.0 0 0.0 0 0.0 0 0.0 0 0.0 0.0 0.0         Snoring  Snoring Rating (loudness 0-4) 1   Snoring Amount (% of total sleep time with snoring) 3.0%     Oximetry Data              Wake NREM REM TST   Average Saturation  96% 95% 96% 96%   Desaturation Index (#/hour)  13.4 16.5 14.0   Desaturation Max Duration (sec)  55.5 40.5 55.5   Minimum O2 Saturation    90%     Oximetry Distribution              WaKe REM NREM TOTAL %TIB   <90% (min) 1.3 0.0 0.0 1.3 0.3%   <85% (min) 0.0 0.0 0.0 0.0 0.0%   <80% (min) 0.0 0.0 0.0 0.0 0.0%   <75% (min) 0.0 0.0 0.0 0.0 0.0%   <70% (min) 0.0 0.0 0.0 0.0 0.0%   <60% (min) 0.0 0.0 0.0 0.0 0.0%   <50% (min) 0.0 0.0 0.0 0.0 0.0%   Fail    (min) 6.7 0.0 0.0 6.7      Respiratory Pattern   Present Absent Duration   Hypoventilation  ü N/A   [place] Respirations  ü N/A   Periodic Breathing  ü N/A     Heart Rate   Mean heart rate (bmp) Maximum heart rate (bmp)   During recording       105   During sleep 55.4 71     Heart Rhythm Summary  Normal sinus rhythm     Heart Rhythm Events  Type of events Present Absent Rate-Duration/Total Duration   Bradycardia  P N/A   Asystole  ü N/A   Sinus Tachycardia During Sleep  ü N/A   Narrow Complex Tachycardia  ü N/A   Wide Complex Tachycardia  ü N/A   Atrial Fibrillation  ü N/A   Other Arrhythmias  P N/A     Arousals   NREM REM Total Arousal Index   Spontaneous 17 3 22 4.1   Respiratory 6 3 9 1.7   Snore 2 0 2 0.4   Leg movement 0 0 0 0.0   Total 25 6 33 6.2     Periodic Limb Movement Data (legs unless otherwise noted)   Leg Movements PLMS PLMS with arousal   # of events 78 73 0   Index (#/hr TST) 14.6 13.7 0                     Marcel Singleton M.D., [facility]         Board Certified Sleep Physician    Note:   The interpreting physician named above, reviewed this record in its entirety, including sleep staging, EMG activity, EEG, EKG, breathing parameters, oxygen saturation, body position, and behavior unless otherwise noted. The interpretation is based on this information in addition to available clinical history and physical examination data.

## 2023-01-02 NOTE — PROCEDURES
[place] [address]  [place], [address]  Phone [phone] Fax [phone]     Final Polysomnogram Report    Patient Name Prince Ng Account Number [de-identified]    1945 Referring Provider Jesse Rangel DO   Age/ Gender 69 years/M Interpreting physician Anderson Kapadia M.D., [facility]   Neck circumference/  Mallampati classification 17.0 in/class 4 Night Technician Irma Greenberg RRT, RPSGT   [place] score 9/24 Scoring Technician Irma Greenberg, RRT,RPSGT   Height 73.0 in Indications for the test excessive daytime somnolence   Weight 230.0 lbs Test Diagnostic Polysomnogram   BMI 30.3 Date of test 2022         Diagnoses:    Obstructive Sleep Apnea (G47.33), AHI of 22.3        Recommendations:    A Titration Polysomnogram has been arranged and a report will follow. II. Mr. Hannah Douglas may benefit from weight reduction. III. He should avoid driving or operating heavy equipment when drowsy and the use of respiratory suppressants. Anderson Kapadia M.D., [facility]         Board certified sleep physician      Final Polysomnogram Report        History:    Prince Ng is a 69year old, 73.0 inch tall, 230.0 pound (BMI 30.3) man with snoring and excessive daytime somnolence who was referred for a polysomnogram.  He has difficulty initiating and maintaining sleep and gets 10 hours of sleep a night. He snores and sweats at night. He is drowsy during the day takes naps. His [place] Sleepiness Score is 9. His medical history is significant for hypertension, hyperlipidemia, diabetes, strokes, MARYANA, chronic kidney disease, depression. Summary of the Polysomnogram:    The polysomnogram performed on 2022 showed obstructive sleep apnea with an apnea and hypopnea index of 22.3 events per hour. He had oxygen desaturations as low as 90% and spent 0.3% of the recorded time with an oxygen saturation less than 90%.   Periodic limb movements were noted with an index of 13.7 events per hour and a PLMS with arousal index was 0. His EKG showed normal sinus rhythm. Please see the data sheets for details.                                  Cristian Gilmore M.D., [facility]         Board certified sleep physician

## 2023-02-26 DIAGNOSIS — G47.33 OBSTRUCTIVE SLEEP APNEA: Primary | ICD-10-CM

## 2023-02-27 NOTE — PROGRESS NOTES
[place] [address]  [place], [address]  Phone [phone] Fax [phone]     Patient Name: Rosa Isela Upton 2/3/2022  : 1945  Age: 69 y.o.   Patient Address: [address] [place] [address] [address] [address]       Patient Phone: [phone] (home) [phone] (work)    REFERRAL  Referred to: DME provider of patient's choice  Rosa Isela Upton is referred for the following:    DME Equipment HPCPS Code Setting   Auto Adjusting BiPAP device with flex or comparable pressure relief per comfort  Min EPAP: 8cm to   Max IPAP: 18cm  PS:  4cm   Heated Humidifier  Patient Choice       Replinishible PAP Supplies, 1 year supply  Item HPCPS Code Frequency   Mask of choice  or  1 per 3 months   Nasal Mask cushion/pillows  or  2 per 30 days   Full Face Mask Interface  1 per 30 days   Headgear  1 per 6 months   Tubing, length of choice  or  1 per 3 months   Water Chamber  1 per 6 months   Chinstrap  1 per 6 months   Disposable Filters  2 per 30 days   Reusable Filters  1 per 6 months     Diagnoses:  Obstructive sleep apnea (G47.33)  Length of Need: Lifetime, 99    Ordering Provider: QUIN Jalloh Pals: [phone]         Signature:       Date: 2/3/2022   Electronically Signed by Nicholas Damon M.D. Patient has been taking over the counter medication, Zantac, as directed.  Patient advised that this medication seems to be working.  Patient would like to know how long he should continue to take.  Please contact jacob bond [phone].  Thank you

## 2023-03-06 ENCOUNTER — HOME HEALTH ADMISSION (OUTPATIENT)
Dept: HOME HEALTH SERVICES | Facility: HOME HEALTHCARE | Age: 78
End: 2023-03-06
Payer: COMMERCIAL

## 2023-05-11 DIAGNOSIS — Z86.718 HISTORY OF DVT (DEEP VEIN THROMBOSIS): Primary | ICD-10-CM

## 2023-05-19 ENCOUNTER — HOSPITAL ENCOUNTER (OUTPATIENT)
Dept: MRI IMAGING | Age: 78
Discharge: HOME | End: 2023-05-19
Payer: COMMERCIAL

## 2023-05-19 DIAGNOSIS — F01.50 VASCULAR DEMENTIA, UNSPECIFIED DEMENTIA SEVERITY, UNSPECIFIED WHETHER BEHAVIORAL, PSYCHOTIC, OR MOOD DISTURBANCE OR ANXIETY (HCC): ICD-10-CM

## 2023-05-19 PROCEDURE — 70551 MRI BRAIN STEM W/O DYE: CPT

## 2023-07-25 ENCOUNTER — TELEPHONE (OUTPATIENT)
Dept: HEMATOLOGY | Age: 78
End: 2023-07-25

## 2023-07-25 NOTE — TELEPHONE ENCOUNTER
Called pt and left message on 7/25/23 at 2:36pm. New patient, told of day/time of appt with Brenda Prieto, and explained New Patient Information packet will be put in mail for him to fill out and bring with for first appt. Left phone number for questions and/or if appt day/time does not work for him.

## 2023-07-28 ENCOUNTER — TELEPHONE (OUTPATIENT)
Dept: PODIATRY | Facility: CLINIC | Age: 78
End: 2023-07-28
Payer: COMMERCIAL

## 2023-07-31 ENCOUNTER — OFFICE VISIT (OUTPATIENT)
Dept: PODIATRY | Facility: CLINIC | Age: 78
End: 2023-07-31
Payer: COMMERCIAL

## 2023-07-31 ENCOUNTER — PATIENT ROUNDING (BHMG ONLY) (OUTPATIENT)
Dept: PODIATRY | Facility: CLINIC | Age: 78
End: 2023-07-31

## 2023-07-31 VITALS
HEART RATE: 76 BPM | SYSTOLIC BLOOD PRESSURE: 122 MMHG | WEIGHT: 232 LBS | BODY MASS INDEX: 30.75 KG/M2 | OXYGEN SATURATION: 96 % | DIASTOLIC BLOOD PRESSURE: 70 MMHG | HEIGHT: 73 IN

## 2023-07-31 DIAGNOSIS — E11.9 ENCOUNTER FOR DIABETIC FOOT EXAM: ICD-10-CM

## 2023-07-31 DIAGNOSIS — R26.9 GAIT ABNORMALITY: ICD-10-CM

## 2023-07-31 DIAGNOSIS — E11.9 TYPE 2 DIABETES MELLITUS WITHOUT COMPLICATION, WITH LONG-TERM CURRENT USE OF INSULIN: ICD-10-CM

## 2023-07-31 DIAGNOSIS — N18.30 HYPERTENSIVE KIDNEY DISEASE, STAGE III: ICD-10-CM

## 2023-07-31 DIAGNOSIS — L60.2 ONYCHOGRYPHOSIS: Primary | ICD-10-CM

## 2023-07-31 DIAGNOSIS — I12.9 HYPERTENSIVE KIDNEY DISEASE, STAGE III: ICD-10-CM

## 2023-07-31 DIAGNOSIS — Z79.4 TYPE 2 DIABETES MELLITUS WITHOUT COMPLICATION, WITH LONG-TERM CURRENT USE OF INSULIN: ICD-10-CM

## 2023-07-31 PROBLEM — G93.40 ENCEPHALOPATHY: Status: ACTIVE | Noted: 2020-03-04

## 2023-07-31 PROBLEM — Z78.9 DIFFICULTY WITH CPAP USE: Status: ACTIVE | Noted: 2022-10-12

## 2023-08-09 ENCOUNTER — OFFICE VISIT (OUTPATIENT)
Dept: NEUROLOGY | Age: 78
End: 2023-08-09
Payer: COMMERCIAL

## 2023-08-09 VITALS
HEIGHT: 73 IN | HEART RATE: 75 BPM | DIASTOLIC BLOOD PRESSURE: 80 MMHG | BODY MASS INDEX: 31.01 KG/M2 | OXYGEN SATURATION: 99 % | SYSTOLIC BLOOD PRESSURE: 124 MMHG | WEIGHT: 234 LBS

## 2023-08-09 DIAGNOSIS — G47.33 OBSTRUCTIVE SLEEP APNEA: Primary | ICD-10-CM

## 2023-08-09 DIAGNOSIS — Z78.9 DIFFICULTY USING BIPHASIC POSITIVE AIRWAY PRESSURE (BIPAP) MACHINE: ICD-10-CM

## 2023-08-09 PROCEDURE — 99214 OFFICE O/P EST MOD 30 MIN: CPT | Performed by: PHYSICIAN ASSISTANT

## 2023-08-09 PROCEDURE — 1123F ACP DISCUSS/DSCN MKR DOCD: CPT | Performed by: PHYSICIAN ASSISTANT

## 2023-08-09 NOTE — PATIENT INSTRUCTIONS
Patient education: Sleep apnea in adults       INTRODUCTION -- Normally during sleep, air moves through the throat and in and out of the lungs at a regular rhythm. In a person with sleep apnea, air movement is periodically diminished or stopped. There are two types of sleep apnea: obstructive sleep apnea and central sleep apnea. In obstructive sleep apnea, breathing is abnormal because of narrowing or closure of the throat. In central sleep apnea, breathing is abnormal because of a change in the breathing control and rhythm. Sleep apnea is a serious condition that can affect a person's ability to safely perform normal daily activities and can affect long term health. Approximately 25 percent of adults are at risk for sleep apnea of some degree. Men are more commonly affected than women. Other risk factors include middle and older age, being overweight or obese, and having a small mouth and throat. This topic review focuses on the most common type of sleep apnea in adults, obstructive sleep apnea (MARYANA). HOW SLEEP APNEA OCCURS -- The throat is surrounded by muscles that control the airway for speaking, swallowing, and breathing. During sleep, these muscles are less active, and this causes the throat to narrow. In most people, this narrowing does not affect breathing. In others, it can cause snoring, sometimes with reduced or completely blocked airflow. A completely blocked airway without airflow is called an obstructive apnea. Partial obstruction with diminished airflow is called a hypopnea. A person may have apnea and hypopnea during sleep. Insufficient breathing due to apnea or hypopnea causes oxygen levels to fall and carbon dioxide to rise. Because the airway is blocked, breathing faster or harder does not help to improve oxygen levels until the airway is reopened. Typically, the obstruction requires the person to awaken to activate the upper airway muscles.  Once the airway is opened, the person then

## 2023-08-09 NOTE — PROGRESS NOTES
Emergency Dept/Urgent Care discharge antibiotic (if prescribed): None  No changes in treatment per Urine culture protocol.
REVIEW OF SYSTEMS    Constitutional: []Fever []Sweats []Chills [] Recent Injury   [x] Denies all unless marked  HENT:[]Headache  [] Head Injury  [] Sore Throat  [] Ear Pain  [] Dizziness [] Hearing Loss   [x] Denies all unless marked  Musculoskeletal: [] Arthralgia  [] Myalgias [] Muscle cramps  [] Muscle twitches   [x] Denies all unless marked   Spine:  [] Neck pain  [] Back pain  [] Sciatica  [x] Denies all unless marked  Neurological:[] Visual Disturbance [] Double Vision [] Slurred Speech [] Trouble swallowing  [] Vertigo [] Tingling [] Numbness [] Weakness [] Loss of Balance   [] Loss of Consciousness [] Memory Loss [] Seizures  [x] Denies all unless marked  Psychiatric/Behavioral:[] Depression [] Anxiety  [x] Denies all unless marked  Sleep: []  Insomnia [] Sleep Disturbance [] Snoring [] Restless Legs [] Daytime Sleepiness  S[x]leep Apnea  [] Denies all unless marked
brought back for a CPAP titration study on 1/26/2023. He did not tolerate CPAP and was changed to BiPAP. At an inspiratory pressure of 17 and an expiratory pressure of 13, his apneas and hypopneas were eliminated. The apnea and hypopnea index on the final pressure was 0.0 events per hour. The lowest oxygen saturation was 92% on the final settings. Periodic limb movements were noted with an index of 11.4 events per hour and a PLMS with arousal index was 3.3. His EKG showed normal sinus rhythm. Please see the data sheets for details. Tameka Montalvo M.D., [facility]                                                                               Board certified sleep physician                  Assessment:       ICD-10-CM    1. Obstructive sleep apnea  G47.33       2. Difficulty using biphasic positive airway pressure (BiPAP) machine  Z78.9              []  :  Stable     []  :  Improved                       []  :  Well controlled              []  :  Resolving     []  :  Resolved     [x]  :  Inadequately controlled     []  :  Worsening     []  :  Additional workup planned      Plan:     No orders of the defined types were placed in this encounter. 1.   Long discussion with pt and caregiver regarding the risks of untreated MARYANA. Risks may include, but are not limited to  hypertension, coronary artery disease, atrial fibrillation, CHF, diabetes, stroke, weight gain, impaired cognition, daytime somnolence, and motor vehicle accidents. Advised to abstain from driving or operating heavy machinery when drowsy and the use of respiratory suppressants. Reviewed treatment plan.  Counseled on multimodal approach to treatment of sleep apnea to

## 2023-08-25 ENCOUNTER — TELEPHONE (OUTPATIENT)
Dept: INFUSION THERAPY | Age: 78
End: 2023-08-25

## 2023-08-25 DIAGNOSIS — D68.9 BLOOD COAGULATION DEFECT (HCC): Primary | ICD-10-CM

## 2023-08-25 NOTE — PROGRESS NOTES
OP HEMATOLOGY/ONCOLOGY CONSULTATION      Pt Name: Ronda Bon: 1945  MRN: [id number]  Referring provider: Jeanne Yen   PCP: Nery Gonzalez DO      Date of evaluation: 8/29/2023   History Obtained From:  patient, [id number], electronic medical record    CHIEF COMPLAINT:    Chief Complaint   Patient presents with    New Patient     Coagulation defect- HX of blood clots     HISTORY OF PRESENT ILLNESS:    Debbie Oden is a 69 y.o.  male referred from Dr. Alto Hodgkin for evaluation of  Coagulation Defect, Unspecified Hx of Blood Clots. Nick Bob has history of DVT of the bilateral lower extremities dating back to at least 2013. He was treated previously on warfarin and is currently on Xarelto 20 mg daily. Has been diagnosed with Parkinson's disease-ischemic component and is under continuation at [facility]. They are potentially going to try antiplatelet therapy. Unfortunately one of his issues he has poor balance related to his Parkinson's and he would be at risk of bleeding being on both Xarelto and antiplatelet therapy    Venous US [facility] 10/18/2013  Obstructing thrombus in the left common femoral vein. The remaining deep veins in the LLE are otherwise normal and patent. Obstructing thrombus throughout a majority of the left greater saphenous vein    Venous US left Bilateral [facility] 6/12/2019  Chronic postthrombotic change in the right popliteal vein. Decreased caliber of the left common femoral and femoral veins without definite   thrombus, also likely chronic postthrombotic change.     Serology 8/29/2022  SPEP-No M spike, negative immunofixation  QI-IgG 881, IgA 233, IgM 69  Free serum light chains- Kappa 44.71, Lambda 22.77, K/L 1.96  CMP-crt 1.9 with GFR 35, Ca 9.4  EMILIANA-None detected     Serology 7/17/2023  CBC-HGB 16.0 MCV 97  CMP- crt 1.85 with GFR 37, Ca 9.6    CT Head Neck Angiogram and Cerebral perfusion with Contrast 6/24/2020 at [facility]

## 2023-08-29 ENCOUNTER — HOSPITAL ENCOUNTER (OUTPATIENT)
Dept: INFUSION THERAPY | Age: 78
Discharge: HOME | End: 2023-08-29
Payer: COMMERCIAL

## 2023-08-29 ENCOUNTER — OFFICE VISIT (OUTPATIENT)
Dept: HEMATOLOGY | Age: 78
End: 2023-08-29
Payer: COMMERCIAL

## 2023-08-29 VITALS
HEIGHT: 73 IN | OXYGEN SATURATION: 96 % | WEIGHT: 230 LBS | DIASTOLIC BLOOD PRESSURE: 64 MMHG | SYSTOLIC BLOOD PRESSURE: 98 MMHG | HEART RATE: 93 BPM | BODY MASS INDEX: 30.48 KG/M2

## 2023-08-29 DIAGNOSIS — D68.9 BLOOD COAGULATION DEFECT (HCC): ICD-10-CM

## 2023-08-29 DIAGNOSIS — Z91.81 AT HIGH RISK FOR FALLS: ICD-10-CM

## 2023-08-29 DIAGNOSIS — N18.32 CHRONIC RENAL FAILURE, STAGE 3B (HCC): ICD-10-CM

## 2023-08-29 DIAGNOSIS — Z86.718 PERSONAL HISTORY OF DVT (DEEP VEIN THROMBOSIS): ICD-10-CM

## 2023-08-29 DIAGNOSIS — G20 PARKINSON'S DISEASE (HCC): ICD-10-CM

## 2023-08-29 DIAGNOSIS — Z86.718 PERSONAL HISTORY OF DVT (DEEP VEIN THROMBOSIS): Primary | ICD-10-CM

## 2023-08-29 LAB
BASOPHILS # BLD: 0.03 K/UL (ref 0.01–0.08)
BASOPHILS NFR BLD: 0.6 % (ref 0.1–1.2)
EOSINOPHIL # BLD: 0.11 K/UL (ref 0.04–0.54)
EOSINOPHIL NFR BLD: 2.3 % (ref 0.7–7)
ERYTHROCYTE [DISTWIDTH] IN BLOOD BY AUTOMATED COUNT: 11.9 % (ref 11.6–14.4)
HCT VFR BLD AUTO: 44.1 % (ref 40.1–51)
HGB BLD-MCNC: 15.2 G/DL (ref 13.7–17.5)
LYMPHOCYTES # BLD: 1.24 K/UL (ref 1.18–3.74)
LYMPHOCYTES NFR BLD: 25.8 % (ref 19.3–53.1)
MCH RBC QN AUTO: 32 PG (ref 25.7–32.2)
MCHC RBC AUTO-ENTMCNC: 34.5 G/DL (ref 32.3–36.5)
MCV RBC AUTO: 92.8 FL (ref 79–92.2)
MONOCYTES # BLD: 0.46 K/UL (ref 0.24–0.82)
MONOCYTES NFR BLD: 9.6 % (ref 4.7–12.5)
NEUTROPHILS # BLD: 2.94 K/UL (ref 1.56–6.13)
NEUTS SEG NFR BLD: 61.3 % (ref 34–71.1)
PLATELET # BLD AUTO: 137 K/UL (ref 163–337)
PMV BLD AUTO: 10.6 FL (ref 7.4–10.4)
RBC # BLD AUTO: 4.75 M/UL (ref 4.63–6.08)
WBC # BLD AUTO: 4.8 K/UL (ref 4.23–9.07)

## 2023-08-29 PROCEDURE — 36415 COLL VENOUS BLD VENIPUNCTURE: CPT

## 2023-08-29 PROCEDURE — 85025 COMPLETE CBC W/AUTO DIFF WBC: CPT

## 2023-08-29 PROCEDURE — 1036F TOBACCO NON-USER: CPT | Performed by: PHYSICIAN ASSISTANT

## 2023-08-29 PROCEDURE — 1123F ACP DISCUSS/DSCN MKR DOCD: CPT | Performed by: PHYSICIAN ASSISTANT

## 2023-08-29 PROCEDURE — G8427 DOCREV CUR MEDS BY ELIG CLIN: HCPCS | Performed by: PHYSICIAN ASSISTANT

## 2023-08-29 PROCEDURE — G8417 CALC BMI ABV UP PARAM F/U: HCPCS | Performed by: PHYSICIAN ASSISTANT

## 2023-08-29 PROCEDURE — 99212 OFFICE O/P EST SF 10 MIN: CPT

## 2023-08-29 PROCEDURE — 99214 OFFICE O/P EST MOD 30 MIN: CPT | Performed by: PHYSICIAN ASSISTANT

## 2023-08-29 RX ORDER — ERGOCALCIFEROL 1.25 MG/1
50000 CAPSULE ORAL WEEKLY
COMMUNITY

## 2023-08-29 ASSESSMENT — ENCOUNTER SYMPTOMS
VOMITING: 0
CONSTIPATION: 0
DIARRHEA: 0
SHORTNESS OF BREATH: 0
TROUBLE SWALLOWING: 0
PHOTOPHOBIA: 0
VOICE CHANGE: 0
WHEEZING: 0
EYE DISCHARGE: 0
COUGH: 0
BLOOD IN STOOL: 0
BACK PAIN: 0
NAUSEA: 0
SORE THROAT: 0
ABDOMINAL PAIN: 0
ABDOMINAL DISTENTION: 0
EYE ITCHING: 0
COLOR CHANGE: 0

## 2023-08-29 ASSESSMENT — PROMIS GLOBAL HEALTH SCALE
IN THE PAST 7 DAYS, HOW WOULD YOU RATE YOUR PAIN ON AVERAGE [ON A SCALE FROM 0 (NO PAIN) TO 10 (WORST IMAGINABLE PAIN)]?: 0
IN GENERAL, HOW WOULD YOU RATE YOUR SATISFACTION WITH YOUR SOCIAL ACTIVITIES AND RELATIONSHIPS [ON A SCALE OF 1 (POOR) TO 5 (EXCELLENT)]?: 2
IN THE PAST 7 DAYS, HOW WOULD YOU RATE YOUR FATIGUE ON AVERAGE [ON A SCALE FROM 1 (NONE) TO 5 (VERY SEVERE)]?: 4
IN GENERAL, HOW WOULD YOU RATE YOUR PHYSICAL HEALTH [ON A SCALE OF 1 (POOR) TO 5 (EXCELLENT)]?: 2
IN GENERAL, PLEASE RATE HOW WELL YOU CARRY OUT YOUR USUAL SOCIAL ACTIVITIES (INCLUDES ACTIVITIES AT HOME, AT WORK, AND IN YOUR COMMUNITY, AND RESPONSIBILITIES AS A PARENT, CHILD, SPOUSE, EMPLOYEE, FRIEND, ETC) [ON A SCALE OF 1 (POOR) TO 5 (EXCELLENT)]?: 3
IN GENERAL, WOULD YOU SAY YOUR HEALTH IS...[ON A SCALE OF 1 (POOR) TO 5 (EXCELLENT)]: 1
SUM OF RESPONSES TO QUESTIONS 3, 6, 7, & 8: 8
IN GENERAL, WOULD YOU SAY YOUR QUALITY OF LIFE IS...[ON A SCALE OF 1 (POOR) TO 5 (EXCELLENT)]: 3
TO WHAT EXTENT ARE YOU ABLE TO CARRY OUT YOUR EVERYDAY PHYSICAL ACTIVITIES SUCH AS WALKING, CLIMBING STAIRS, CARRYING GROCERIES, OR MOVING A CHAIR [ON A SCALE OF 1 (NOT AT ALL) TO 5 (COMPLETELY)]?: 2
IN THE PAST 7 DAYS, HOW OFTEN HAVE YOU BEEN BOTHERED BY EMOTIONAL PROBLEMS, SUCH AS FEELING ANXIOUS, DEPRESSED, OR IRRITABLE [ON A SCALE FROM 1 (NEVER) TO 5 (ALWAYS)]?: 3
IN GENERAL, HOW WOULD YOU RATE YOUR MENTAL HEALTH, INCLUDING YOUR MOOD AND YOUR ABILITY TO THINK [ON A SCALE OF 1 (POOR) TO 5 (EXCELLENT)]?: 2
SUM OF RESPONSES TO QUESTIONS 2, 4, 5, & 10: 10

## 2023-08-31 LAB — HCYS SERPL-SCNC: 18 UMOL/L (ref 0–15)

## 2023-09-01 ENCOUNTER — TELEPHONE (OUTPATIENT)
Dept: HEMATOLOGY | Age: 78
End: 2023-09-01

## 2023-09-01 LAB
ALBUMIN SERPL-MCNC: 3.95 G/DL (ref 3.75–5.01)
ALPHA1 GLOB SERPL ELPH-MCNC: 0.28 G/DL (ref 0.19–0.46)
ALPHA2 GLOB SERPL ELPH-MCNC: 0.65 G/DL (ref 0.48–1.05)
APTT IMM NP PPP: ABNORMAL SEC (ref 32–48)
APTT P HEP NEUT PPP: ABNORMAL SEC (ref 32–48)
B-GLOBULIN SERPL ELPH-MCNC: 0.78 G/DL (ref 0.48–1.1)
B2 GLYCOPROT1 IGG SERPL IA-ACNC: <10 SGU
B2 GLYCOPROT1 IGM SERPL IA-ACNC: <10 SMU
CARDIOLIPIN IGG SER IA-ACNC: <10 GPL
CARDIOLIPIN IGM SER IA-ACNC: <10 MPL
CONFIRM APTT STACLOT: ABNORMAL
DEPRECATED KAPPA LC FREE/LAMBDA SER: 2.02 {RATIO} (ref 0.26–1.65)
DRVVT SCREEN TO CONFIRM RATIO: ABNORMAL RATIO
EER MONOCLONAL PROTEIN AND FLC, SERUM: ABNORMAL
GAMMA GLOB SERPL ELPH-MCNC: 0.94 G/DL (ref 0.62–1.51)
IGA SERPL-MCNC: 295 MG/DL (ref 68–408)
IGG SERPL-MCNC: 971 MG/DL (ref 768–1632)
IGM SERPL-MCNC: 98 MG/DL (ref 35–263)
INTERPRETATION SERPL IFE-IMP: ABNORMAL
INTERPRETATION SERPL IFE-IMP: ABNORMAL
KAPPA LC FREE SER-MCNC: 48.12 MG/L (ref 3.3–19.4)
LA 3 SCREEN W REFLEX-IMP: ABNORMAL
LA NT DPL PPP QL: ABNORMAL
LAMBDA LC FREE SERPL-MCNC: 23.86 MG/L (ref 5.71–26.3)
MIXING DRVVT: 42 SEC (ref 33–44)
MONOCLONAL PROTEIN, SERUM: ABNORMAL G/DL
PROT C AG ACT/NOR PPP IA: 88 % (ref 63–153)
PROT S FREE AG ACT/NOR PPP IA: 73 % (ref 74–147)
PROT SERPL-MCNC: 6.6 G/DL (ref 6.3–8.2)
PROTHROMBIN TIME: 17.1 SEC (ref 12–15.5)
REPTILASE TIME: ABNORMAL SEC
SCREEN APTT: 45 SEC (ref 32–48)
SCREEN DRVVT: 50 SEC (ref 33–44)
THROMBIN TIME: ABNORMAL SEC (ref 14.7–19.5)

## 2023-09-01 RX ORDER — FOLIC ACID 1 MG/1
1 TABLET ORAL DAILY
Qty: 30 TABLET | Refills: 2 | Status: SHIPPED | OUTPATIENT
Start: 2023-09-01

## 2023-09-01 NOTE — TELEPHONE ENCOUNTER
----- Message from Alice Valenzuela PA-C sent at 9/1/2023  8:01 AM CDT -----  Start folic acid 1 mg daily

## 2023-09-02 LAB
F5 P.R506Q BLD/T QL: NEGATIVE
SPECIMEN SOURCE: NORMAL

## 2023-09-04 LAB
F2 C.20210G>A GENO BLD/T: NEGATIVE
SPECIMEN SOURCE: NORMAL

## 2023-09-13 ENCOUNTER — HOSPITAL ENCOUNTER (OUTPATIENT)
Dept: NON INVASIVE DIAGNOSTICS | Age: 78
Discharge: HOME | End: 2023-09-13
Payer: COMMERCIAL

## 2023-09-13 DIAGNOSIS — Z86.718 PERSONAL HISTORY OF DVT (DEEP VEIN THROMBOSIS): ICD-10-CM

## 2023-09-13 PROCEDURE — 93970 EXTREMITY STUDY: CPT

## 2023-10-10 ENCOUNTER — TELEPHONE (OUTPATIENT)
Dept: HEMATOLOGY | Age: 78
End: 2023-10-10

## 2023-10-10 NOTE — PROGRESS NOTES
right popliteal vein. Decreased caliber of the left common femoral and femoral veins without definite   thrombus, also likely chronic postthrombotic change. His initial CBC in the office on 8/29/2023 revealed a WBC of 4.8 with 61.3% PMN, 25.8% lymphocyte, 9.6% monocyte, 2.3% eosinophil, 0.6% basophil. Hgb 15.2, HCT 44.1, MCV 92.8, ,000. He does not have any findings that is suspicious for MPN. Platelet count is mildly low and will be rechecked at his next visit. Serology 8/29/2023  Factor V Leiden -Negative  Prothrombin 2 gene-Negative  Cardiolipin ab IgG <10 ,IgM-<10  Beta-2 glycoprotein IgG <10,IgM-<10  Lupus anticoagulant-not detected  Homocysteine-18  Protein S antigen, free-73  Protein C antigen, total-88      Noninvasive venous study BLE 9/13/2023 at [facility]. There is evidence of chronic deep vein thrombosis in the right lower extremity involving the popliteal vein. There is evidence of chronic deep vein thrombosis in the left lower extremity involving the common femoral vein. There is no evidence of superficial thrombophlebitis of the bilateral lower extremities. Evidence of reflux/venous insufficiency in the deep/popliteal vein of the right lower extremity. Unfortunately has noninvasive venous studies that were performed on 9/13/2023 above reveals chronic DVT involving the right popliteal vein and left common femoral vein. I discussed potential issues with edema that could occur because of this. For now he would need to continue on his anticoagulation. Consideration of decreasing his Xarelto from 20 mg down to 15 mg daily with GFR running in the mid upper 30 range. I am going to just recheck a protein S today as was minimally below most likely related to anticoagulation he is on. Homocystine level was mildly elevated so he is on folic acid daily.     Recommendation  Continue anticoagulation with Xarelto due to chronic DVT of BLE-consider decreasing dose to 15 mg daily based

## 2023-10-11 ENCOUNTER — HOSPITAL ENCOUNTER (OUTPATIENT)
Dept: INFUSION THERAPY | Age: 78
Discharge: HOME | End: 2023-10-11
Payer: COMMERCIAL

## 2023-10-11 ENCOUNTER — OFFICE VISIT (OUTPATIENT)
Dept: HEMATOLOGY | Age: 78
End: 2023-10-11
Payer: COMMERCIAL

## 2023-10-11 VITALS
OXYGEN SATURATION: 90 % | SYSTOLIC BLOOD PRESSURE: 120 MMHG | HEIGHT: 73 IN | HEART RATE: 110 BPM | DIASTOLIC BLOOD PRESSURE: 72 MMHG | TEMPERATURE: 97.9 F | WEIGHT: 230.3 LBS | BODY MASS INDEX: 30.52 KG/M2

## 2023-10-11 DIAGNOSIS — N18.32 CHRONIC RENAL FAILURE, STAGE 3B (HCC): ICD-10-CM

## 2023-10-11 DIAGNOSIS — Z86.718 PERSONAL HISTORY OF DVT (DEEP VEIN THROMBOSIS): Primary | ICD-10-CM

## 2023-10-11 DIAGNOSIS — D47.2 GAMMOPATHY: ICD-10-CM

## 2023-10-11 DIAGNOSIS — Z86.718 PERSONAL HISTORY OF DVT (DEEP VEIN THROMBOSIS): ICD-10-CM

## 2023-10-11 DIAGNOSIS — I82.512 CHRONIC DEEP VEIN THROMBOSIS (DVT) OF FEMORAL VEIN OF LEFT LOWER EXTREMITY (HCC): ICD-10-CM

## 2023-10-11 DIAGNOSIS — I82.531 CHRONIC DEEP VEIN THROMBOSIS (DVT) OF POPLITEAL VEIN OF RIGHT LOWER EXTREMITY (HCC): ICD-10-CM

## 2023-10-11 DIAGNOSIS — N18.32 CHRONIC RENAL FAILURE, STAGE 3B (HCC): Primary | ICD-10-CM

## 2023-10-11 LAB
ALBUMIN SERPL-MCNC: 4.3 G/DL (ref 3.5–5.2)
ALP SERPL-CCNC: 77 U/L (ref 40–130)
ALT SERPL-CCNC: 25 U/L (ref 21–72)
ANION GAP SERPL CALCULATED.3IONS-SCNC: 6 MMOL/L (ref 7–19)
AST SERPL-CCNC: 35 U/L (ref 17–59)
BASOPHILS # BLD: 0.04 K/UL (ref 0.01–0.08)
BASOPHILS NFR BLD: 0.9 % (ref 0.1–1.2)
BILIRUB SERPL-MCNC: 0.6 MG/DL (ref 0.2–1.3)
BUN SERPL-MCNC: 35 MG/DL (ref 9–20)
CALCIUM SERPL-MCNC: 10 MG/DL (ref 8.4–10.2)
CHLORIDE SERPL-SCNC: 107 MMOL/L (ref 98–111)
CO2 SERPL-SCNC: 27 MMOL/L (ref 22–29)
CREAT SERPL-MCNC: 1.8 MG/DL (ref 0.6–1.2)
EOSINOPHIL # BLD: 0.11 K/UL (ref 0.04–0.54)
EOSINOPHIL NFR BLD: 2.4 % (ref 0.7–7)
ERYTHROCYTE [DISTWIDTH] IN BLOOD BY AUTOMATED COUNT: 12.7 % (ref 11.6–14.4)
GLOBULIN: 2.8 G/DL
GLUCOSE SERPL-MCNC: 210 MG/DL (ref 74–106)
HCT VFR BLD AUTO: 42.7 % (ref 40.1–51)
HGB BLD-MCNC: 14.5 G/DL (ref 13.7–17.5)
LDH SERPL-CCNC: 184 U/L (ref 120–246)
LYMPHOCYTES # BLD: 1.33 K/UL (ref 1.18–3.74)
LYMPHOCYTES NFR BLD: 28.5 % (ref 19.3–53.1)
MCH RBC QN AUTO: 32.3 PG (ref 25.7–32.2)
MCHC RBC AUTO-ENTMCNC: 34 G/DL (ref 32.3–36.5)
MCV RBC AUTO: 95.1 FL (ref 79–92.2)
MONOCYTES # BLD: 0.47 K/UL (ref 0.24–0.82)
MONOCYTES NFR BLD: 10.1 % (ref 4.7–12.5)
NEUTROPHILS # BLD: 2.69 K/UL (ref 1.56–6.13)
NEUTS SEG NFR BLD: 57.7 % (ref 34–71.1)
PLATELET # BLD AUTO: 153 K/UL (ref 163–337)
PMV BLD AUTO: 10.5 FL (ref 7.4–10.4)
POTASSIUM SERPL-SCNC: 4.8 MMOL/L (ref 3.5–5.1)
PROT SERPL-MCNC: 7.1 G/DL (ref 6.3–8.2)
RBC # BLD AUTO: 4.49 M/UL (ref 4.63–6.08)
SODIUM SERPL-SCNC: 140 MMOL/L (ref 137–145)
WBC # BLD AUTO: 4.66 K/UL (ref 4.23–9.07)

## 2023-10-11 PROCEDURE — 99212 OFFICE O/P EST SF 10 MIN: CPT

## 2023-10-11 PROCEDURE — 85025 COMPLETE CBC W/AUTO DIFF WBC: CPT

## 2023-10-11 PROCEDURE — G8428 CUR MEDS NOT DOCUMENT: HCPCS | Performed by: PHYSICIAN ASSISTANT

## 2023-10-11 PROCEDURE — G8484 FLU IMMUNIZE NO ADMIN: HCPCS | Performed by: PHYSICIAN ASSISTANT

## 2023-10-11 PROCEDURE — 1036F TOBACCO NON-USER: CPT | Performed by: PHYSICIAN ASSISTANT

## 2023-10-11 PROCEDURE — 1123F ACP DISCUSS/DSCN MKR DOCD: CPT | Performed by: PHYSICIAN ASSISTANT

## 2023-10-11 PROCEDURE — 36415 COLL VENOUS BLD VENIPUNCTURE: CPT

## 2023-10-11 PROCEDURE — G8417 CALC BMI ABV UP PARAM F/U: HCPCS | Performed by: PHYSICIAN ASSISTANT

## 2023-10-11 PROCEDURE — 99214 OFFICE O/P EST MOD 30 MIN: CPT | Performed by: PHYSICIAN ASSISTANT

## 2023-10-11 RX ORDER — MODAFINIL 200 MG/1
TABLET ORAL
COMMUNITY
Start: 2023-09-22

## 2023-10-11 ASSESSMENT — ENCOUNTER SYMPTOMS
SHORTNESS OF BREATH: 0
DIARRHEA: 0
TROUBLE SWALLOWING: 0
PHOTOPHOBIA: 0
BLOOD IN STOOL: 0
ABDOMINAL DISTENTION: 0
EYE ITCHING: 0
COLOR CHANGE: 0
WHEEZING: 0
BACK PAIN: 0
VOMITING: 0
CONSTIPATION: 0
VOICE CHANGE: 0
ABDOMINAL PAIN: 0
NAUSEA: 0
SORE THROAT: 0
EYE DISCHARGE: 0
COUGH: 0

## 2023-10-13 LAB — B2 MICROGLOB SERPL-MCNC: 4.2 MG/L

## 2023-10-14 LAB
ALBUMIN SERPL-MCNC: 3.98 G/DL (ref 3.75–5.01)
ALPHA1 GLOB SERPL ELPH-MCNC: 0.3 G/DL (ref 0.19–0.46)
ALPHA2 GLOB SERPL ELPH-MCNC: 0.64 G/DL (ref 0.48–1.05)
B-GLOBULIN SERPL ELPH-MCNC: 0.77 G/DL (ref 0.48–1.1)
DEPRECATED KAPPA LC FREE/LAMBDA SER: 2 {RATIO} (ref 0.26–1.65)
EER MONOCLONAL PROTEIN AND FLC, SERUM: ABNORMAL
GAMMA GLOB SERPL ELPH-MCNC: 0.92 G/DL (ref 0.62–1.51)
IGA SERPL-MCNC: 286 MG/DL (ref 68–408)
IGG SERPL-MCNC: 911 MG/DL (ref 768–1632)
IGM SERPL-MCNC: 89 MG/DL (ref 35–263)
INTERPRETATION SERPL IFE-IMP: ABNORMAL
INTERPRETATION SERPL IFE-IMP: ABNORMAL
KAPPA LC FREE SER-MCNC: 48.42 MG/L (ref 3.3–19.4)
LAMBDA LC FREE SERPL-MCNC: 24.27 MG/L (ref 5.71–26.3)
MONOCLONAL PROTEIN, SERUM: ABNORMAL G/DL
PROT S AG ACT/NOR PPP IA: 114 % (ref 84–134)
PROT SERPL-MCNC: 6.6 G/DL (ref 6.3–8.2)

## 2023-10-15 LAB — PROT S FREE AG ACT/NOR PPP IA: 81 % (ref 74–147)

## 2023-10-31 ENCOUNTER — TELEPHONE (OUTPATIENT)
Dept: INTERNAL MEDICINE | Age: 78
End: 2023-10-31

## 2023-10-31 NOTE — TELEPHONE ENCOUNTER
Isiah Salts caregiver , Carmen Thompsonutant ,came into the office requesting Dr. Obed Rivera to be his new PCP. He has several serious conditions and diagnoses. I told her I would send an encounter and someone would be calling her. I told her there is also our NP, Corbin Tony, who would work well with him.

## 2023-11-02 PROBLEM — I12.9 HYPERTENSIVE KIDNEY DISEASE, STAGE III (HCC): Status: ACTIVE | Noted: 2019-06-08

## 2023-11-02 PROBLEM — E55.9 VITAMIN D DEFICIENCY: Status: ACTIVE | Noted: 2023-11-02

## 2023-11-02 PROBLEM — I63.9 CEREBROVASCULAR ACCIDENT (HCC): Status: ACTIVE | Noted: 2017-02-20

## 2023-11-02 PROBLEM — F33.2 SEVERE EPISODE OF RECURRENT MAJOR DEPRESSIVE DISORDER, WITHOUT PSYCHOTIC FEATURES (HCC): Status: ACTIVE | Noted: 2019-06-08

## 2023-11-02 PROBLEM — E11.21 TYPE 2 DIABETES MELLITUS WITH DIABETIC NEPHROPATHY (HCC): Status: ACTIVE | Noted: 2021-03-09

## 2023-11-02 PROBLEM — N18.30 HYPERTENSIVE KIDNEY DISEASE, STAGE III (HCC): Status: ACTIVE | Noted: 2019-06-08

## 2023-11-02 PROBLEM — R41.82 ALTERED MENTAL STATUS: Status: ACTIVE | Noted: 2019-06-08

## 2023-11-02 PROBLEM — G93.40 ENCEPHALOPATHY: Status: ACTIVE | Noted: 2020-03-04

## 2023-11-02 PROBLEM — E29.1 HYPOGONADISM IN MALE: Status: ACTIVE | Noted: 2018-10-12

## 2023-11-02 RX ORDER — PROCHLORPERAZINE 25 MG/1
SUPPOSITORY RECTAL
COMMUNITY
Start: 2022-11-07

## 2023-11-02 RX ORDER — LISINOPRIL 20 MG/1
20 TABLET ORAL DAILY
COMMUNITY
End: 2023-12-26 | Stop reason: ALTCHOICE

## 2023-11-02 RX ORDER — ERGOCALCIFEROL 1.25 MG/1
1 CAPSULE ORAL WEEKLY
COMMUNITY
Start: 2023-07-20 | End: 2023-12-26 | Stop reason: ALTCHOICE

## 2023-11-02 RX ORDER — LEVOTHYROXINE SODIUM 88 UG/1
88 TABLET ORAL DAILY
COMMUNITY
Start: 2023-09-21

## 2023-11-02 RX ORDER — TESTOSTERONE CYPIONATE 200 MG/ML
1 VIAL (ML) INTRAMUSCULAR
COMMUNITY
Start: 2023-09-25

## 2023-11-02 RX ORDER — INSULIN GLARGINE 100 [IU]/ML
20 INJECTION, SOLUTION SUBCUTANEOUS DAILY
COMMUNITY
End: 2023-12-26

## 2023-11-02 RX ORDER — GLIMEPIRIDE 1 MG/1
1 TABLET ORAL DAILY
COMMUNITY
Start: 2023-09-21 | End: 2023-12-26

## 2023-11-02 RX ORDER — TESTOSTERONE 12.5 MG/1.25G
GEL TOPICAL
COMMUNITY
End: 2023-12-26

## 2023-12-26 ENCOUNTER — OFFICE VISIT (OUTPATIENT)
Dept: INTERNAL MEDICINE | Age: 78
End: 2023-12-26
Payer: COMMERCIAL

## 2023-12-26 VITALS
OXYGEN SATURATION: 100 % | HEART RATE: 72 BPM | HEIGHT: 73 IN | WEIGHT: 240 LBS | SYSTOLIC BLOOD PRESSURE: 128 MMHG | DIASTOLIC BLOOD PRESSURE: 70 MMHG | BODY MASS INDEX: 31.81 KG/M2

## 2023-12-26 DIAGNOSIS — N18.32 STAGE 3B CHRONIC KIDNEY DISEASE (HCC): ICD-10-CM

## 2023-12-26 DIAGNOSIS — F01.50 VASCULAR DEMENTIA WITHOUT BEHAVIORAL DISTURBANCE, PSYCHOTIC DISTURBANCE, MOOD DISTURBANCE, OR ANXIETY, UNSPECIFIED DEMENTIA SEVERITY (HCC): ICD-10-CM

## 2023-12-26 DIAGNOSIS — I12.9 HYPERTENSIVE KIDNEY DISEASE, STAGE III (HCC): ICD-10-CM

## 2023-12-26 DIAGNOSIS — Z79.4 TYPE 2 DIABETES MELLITUS WITH DIABETIC NEPHROPATHY, WITH LONG-TERM CURRENT USE OF INSULIN (HCC): Primary | ICD-10-CM

## 2023-12-26 DIAGNOSIS — I10 PRIMARY HYPERTENSION: ICD-10-CM

## 2023-12-26 DIAGNOSIS — F33.2 SEVERE EPISODE OF RECURRENT MAJOR DEPRESSIVE DISORDER, WITHOUT PSYCHOTIC FEATURES (HCC): ICD-10-CM

## 2023-12-26 DIAGNOSIS — G21.4 VASCULAR PARKINSONISM (HCC): ICD-10-CM

## 2023-12-26 DIAGNOSIS — N18.30 HYPERTENSIVE KIDNEY DISEASE, STAGE III (HCC): ICD-10-CM

## 2023-12-26 DIAGNOSIS — E34.9 TESTOSTERONE DEFICIENCY: ICD-10-CM

## 2023-12-26 DIAGNOSIS — E11.21 TYPE 2 DIABETES MELLITUS WITH DIABETIC NEPHROPATHY, WITH LONG-TERM CURRENT USE OF INSULIN (HCC): Primary | ICD-10-CM

## 2023-12-26 PROBLEM — G20.B1 PARKINSON'S DISEASE WITH DYSKINESIA WITHOUT FLUCTUATING MANIFESTATIONS: Status: ACTIVE | Noted: 2023-12-26

## 2023-12-26 PROCEDURE — 1123F ACP DISCUSS/DSCN MKR DOCD: CPT | Performed by: INTERNAL MEDICINE

## 2023-12-26 PROCEDURE — 96372 THER/PROPH/DIAG INJ SC/IM: CPT | Performed by: INTERNAL MEDICINE

## 2023-12-26 PROCEDURE — 3074F SYST BP LT 130 MM HG: CPT | Performed by: INTERNAL MEDICINE

## 2023-12-26 PROCEDURE — 99214 OFFICE O/P EST MOD 30 MIN: CPT | Performed by: INTERNAL MEDICINE

## 2023-12-26 PROCEDURE — 3078F DIAST BP <80 MM HG: CPT | Performed by: INTERNAL MEDICINE

## 2023-12-26 RX ORDER — TESTOSTERONE CYPIONATE 200 MG/ML
200 INJECTION, SOLUTION INTRAMUSCULAR ONCE
Status: COMPLETED | OUTPATIENT
Start: 2023-12-26 | End: 2023-12-26

## 2023-12-26 RX ORDER — DONEPEZIL HYDROCHLORIDE 5 MG/1
5 TABLET, FILM COATED ORAL NIGHTLY
COMMUNITY

## 2023-12-26 RX ADMIN — TESTOSTERONE CYPIONATE 200 MG: 200 INJECTION, SOLUTION INTRAMUSCULAR at 14:04

## 2023-12-26 SDOH — HEALTH STABILITY: PHYSICAL HEALTH: ON AVERAGE, HOW MANY DAYS PER WEEK DO YOU ENGAGE IN MODERATE TO STRENUOUS EXERCISE (LIKE A BRISK WALK)?: 3 DAYS

## 2023-12-26 SDOH — ECONOMIC STABILITY: HOUSING INSECURITY
IN THE LAST 12 MONTHS, WAS THERE A TIME WHEN YOU DID NOT HAVE A STEADY PLACE TO SLEEP OR SLEPT IN A SHELTER (INCLUDING NOW)?: NO

## 2023-12-26 SDOH — HEALTH STABILITY: PHYSICAL HEALTH: ON AVERAGE, HOW MANY MINUTES DO YOU ENGAGE IN EXERCISE AT THIS LEVEL?: 20 MIN

## 2023-12-26 SDOH — ECONOMIC STABILITY: FOOD INSECURITY: WITHIN THE PAST 12 MONTHS, THE FOOD YOU BOUGHT JUST DIDN'T LAST AND YOU DIDN'T HAVE MONEY TO GET MORE.: NEVER TRUE

## 2023-12-26 SDOH — ECONOMIC STABILITY: FOOD INSECURITY: WITHIN THE PAST 12 MONTHS, YOU WORRIED THAT YOUR FOOD WOULD RUN OUT BEFORE YOU GOT MONEY TO BUY MORE.: NEVER TRUE

## 2023-12-26 SDOH — ECONOMIC STABILITY: INCOME INSECURITY: HOW HARD IS IT FOR YOU TO PAY FOR THE VERY BASICS LIKE FOOD, HOUSING, MEDICAL CARE, AND HEATING?: NOT HARD AT ALL

## 2023-12-26 ASSESSMENT — PATIENT HEALTH QUESTIONNAIRE - PHQ9
2. FEELING DOWN, DEPRESSED OR HOPELESS: 0
10. IF YOU CHECKED OFF ANY PROBLEMS, HOW DIFFICULT HAVE THESE PROBLEMS MADE IT FOR YOU TO DO YOUR WORK, TAKE CARE OF THINGS AT HOME, OR GET ALONG WITH OTHER PEOPLE: 0
7. TROUBLE CONCENTRATING ON THINGS, SUCH AS READING THE NEWSPAPER OR WATCHING TELEVISION: 0
6. FEELING BAD ABOUT YOURSELF - OR THAT YOU ARE A FAILURE OR HAVE LET YOURSELF OR YOUR FAMILY DOWN: 0
SUM OF ALL RESPONSES TO PHQ QUESTIONS 1-9: 4
1. LITTLE INTEREST OR PLEASURE IN DOING THINGS: 1
8. MOVING OR SPEAKING SO SLOWLY THAT OTHER PEOPLE COULD HAVE NOTICED. OR THE OPPOSITE, BEING SO FIGETY OR RESTLESS THAT YOU HAVE BEEN MOVING AROUND A LOT MORE THAN USUAL: 0
4. FEELING TIRED OR HAVING LITTLE ENERGY: 1
5. POOR APPETITE OR OVEREATING: 1
SUM OF ALL RESPONSES TO PHQ QUESTIONS 1-9: 4
SUM OF ALL RESPONSES TO PHQ QUESTIONS 1-9: 4
3. TROUBLE FALLING OR STAYING ASLEEP: 1
SUM OF ALL RESPONSES TO PHQ9 QUESTIONS 1 & 2: 1
SUM OF ALL RESPONSES TO PHQ QUESTIONS 1-9: 4
9. THOUGHTS THAT YOU WOULD BE BETTER OFF DEAD, OR OF HURTING YOURSELF: 0

## 2023-12-26 ASSESSMENT — COLUMBIA-SUICIDE SEVERITY RATING SCALE - C-SSRS
4. HAVE YOU HAD THESE THOUGHTS AND HAD SOME INTENTION OF ACTING ON THEM?: NO
5. HAVE YOU STARTED TO WORK OUT OR WORKED OUT THE DETAILS OF HOW TO KILL YOURSELF? DO YOU INTEND TO CARRY OUT THIS PLAN?: NO
3. HAVE YOU BEEN THINKING ABOUT HOW YOU MIGHT KILL YOURSELF?: NO
7. DID THIS OCCUR IN THE LAST THREE MONTHS: NO

## 2023-12-26 NOTE — PROGRESS NOTES
Chief Complaint   Patient presents with    New Patient     Est. Care       HPI: Pt with diabetes since mid 90's.  He then developed CKD due to diabetes.  He then moved to [place] and had a stroke while in [place] and had multiple \"mini- strokes\" and now Parkinsonism due to vascular etiology.  BS running 200s.  90- 200 plus.  Patient with some difficulty with speech.  Is here with his ex-wife she states his cognition seems okay just slower speech communication etc.  Weakness changes from stroke.    Past Medical History:   Diagnosis Date    BiPAP (biphasic positive airway pressure) dependence     Cerebrovascular disease 2017    Chronic kidney disease 2004    Depression     Diabetes (HCC)     Hearing loss     Hyperlipidemia     Hypersomnia     Hypertension     Mild cognitive impairment     MARYANA (obstructive sleep apnea)     Osteoarthritis     Stage 3 chronic kidney disease (HCC)     Stroke ([facility])     Type 2 diabetes mellitus without complication ([facility]) 1998    Vascular disease        Past Surgical History:   Procedure Laterality Date    EYE SURGERY  2018    Cataracts temoval       Family History   Problem Relation Age of Onset    High Blood Pressure Mother     High Cholesterol Mother     Obesity Brother        Social History     Socioeconomic History    Marital status:      Spouse name: Not on file    Number of children: Not on file    Years of education: Not on file    Highest education level: Not on file   Occupational History    Not on file   Tobacco Use    Smoking status: Never    Smokeless tobacco: Never   Substance and Sexual Activity    Alcohol use: Not Currently    Drug use: Never    Sexual activity: Not Currently     Partners: Female   Other Topics Concern    Not on file   Social History Narrative    Not on file     Social Determinants of Health     Financial Resource Strain: Low Risk  (12/26/2023)    Overall Financial Resource Strain (CARDIA)     Difficulty of Paying Living Expenses: Not hard at all

## 2024-01-02 PROBLEM — G20.B1 PARKINSON'S DISEASE WITH DYSKINESIA WITHOUT FLUCTUATING MANIFESTATIONS: Status: RESOLVED | Noted: 2023-12-26 | Resolved: 2024-01-02

## 2024-01-30 ENCOUNTER — OFFICE VISIT (OUTPATIENT)
Dept: INTERNAL MEDICINE | Age: 79
End: 2024-01-30
Payer: COMMERCIAL

## 2024-01-30 VITALS
SYSTOLIC BLOOD PRESSURE: 118 MMHG | OXYGEN SATURATION: 99 % | HEART RATE: 90 BPM | BODY MASS INDEX: 31.28 KG/M2 | HEIGHT: 73 IN | DIASTOLIC BLOOD PRESSURE: 68 MMHG | WEIGHT: 236 LBS

## 2024-01-30 DIAGNOSIS — I10 PRIMARY HYPERTENSION: ICD-10-CM

## 2024-01-30 DIAGNOSIS — E11.21 TYPE 2 DIABETES MELLITUS WITH DIABETIC NEPHROPATHY, WITH LONG-TERM CURRENT USE OF INSULIN (HCC): Primary | ICD-10-CM

## 2024-01-30 DIAGNOSIS — Z79.4 TYPE 2 DIABETES MELLITUS WITH DIABETIC NEPHROPATHY, WITH LONG-TERM CURRENT USE OF INSULIN (HCC): Primary | ICD-10-CM

## 2024-01-30 DIAGNOSIS — Z12.5 SCREENING FOR PROSTATE CANCER: ICD-10-CM

## 2024-01-30 DIAGNOSIS — E34.9 TESTOSTERONE DEFICIENCY: ICD-10-CM

## 2024-01-30 DIAGNOSIS — G21.4 VASCULAR PARKINSONISM (HCC): ICD-10-CM

## 2024-01-30 DIAGNOSIS — K62.89 RECTAL IRRITATION: ICD-10-CM

## 2024-01-30 DIAGNOSIS — F32.A DEPRESSIVE DISORDER: ICD-10-CM

## 2024-01-30 PROCEDURE — 3078F DIAST BP <80 MM HG: CPT | Performed by: INTERNAL MEDICINE

## 2024-01-30 PROCEDURE — 1123F ACP DISCUSS/DSCN MKR DOCD: CPT | Performed by: INTERNAL MEDICINE

## 2024-01-30 PROCEDURE — 3074F SYST BP LT 130 MM HG: CPT | Performed by: INTERNAL MEDICINE

## 2024-01-30 PROCEDURE — 99214 OFFICE O/P EST MOD 30 MIN: CPT | Performed by: INTERNAL MEDICINE

## 2024-01-30 PROCEDURE — 96372 THER/PROPH/DIAG INJ SC/IM: CPT | Performed by: INTERNAL MEDICINE

## 2024-01-30 RX ORDER — INSULIN ASPART 100 [IU]/ML
INJECTION, SOLUTION INTRAVENOUS; SUBCUTANEOUS
Qty: 5 ADJUSTABLE DOSE PRE-FILLED PEN SYRINGE | Refills: 3 | Status: SHIPPED | OUTPATIENT
Start: 2024-01-30

## 2024-01-30 RX ORDER — TESTOSTERONE CYPIONATE 200 MG/ML
200 INJECTION, SOLUTION INTRAMUSCULAR ONCE
Status: COMPLETED | OUTPATIENT
Start: 2024-01-30 | End: 2024-01-30

## 2024-01-30 RX ORDER — HYDROCORTISONE 25 MG/G
CREAM TOPICAL
Qty: 28 G | Refills: 0 | Status: SHIPPED | OUTPATIENT
Start: 2024-01-30

## 2024-01-30 RX ADMIN — TESTOSTERONE CYPIONATE 200 MG: 200 INJECTION, SOLUTION INTRAMUSCULAR at 16:46

## 2024-01-30 ASSESSMENT — PATIENT HEALTH QUESTIONNAIRE - PHQ9
5. POOR APPETITE OR OVEREATING: 1
4. FEELING TIRED OR HAVING LITTLE ENERGY: 1
10. IF YOU CHECKED OFF ANY PROBLEMS, HOW DIFFICULT HAVE THESE PROBLEMS MADE IT FOR YOU TO DO YOUR WORK, TAKE CARE OF THINGS AT HOME, OR GET ALONG WITH OTHER PEOPLE: 0
1. LITTLE INTEREST OR PLEASURE IN DOING THINGS: 1
3. TROUBLE FALLING OR STAYING ASLEEP: 1
6. FEELING BAD ABOUT YOURSELF - OR THAT YOU ARE A FAILURE OR HAVE LET YOURSELF OR YOUR FAMILY DOWN: 0
7. TROUBLE CONCENTRATING ON THINGS, SUCH AS READING THE NEWSPAPER OR WATCHING TELEVISION: 0
9. THOUGHTS THAT YOU WOULD BE BETTER OFF DEAD, OR OF HURTING YOURSELF: 0
SUM OF ALL RESPONSES TO PHQ QUESTIONS 1-9: 4
2. FEELING DOWN, DEPRESSED OR HOPELESS: 0
8. MOVING OR SPEAKING SO SLOWLY THAT OTHER PEOPLE COULD HAVE NOTICED. OR THE OPPOSITE, BEING SO FIGETY OR RESTLESS THAT YOU HAVE BEEN MOVING AROUND A LOT MORE THAN USUAL: 0
SUM OF ALL RESPONSES TO PHQ9 QUESTIONS 1 & 2: 1

## 2024-01-30 NOTE — PROGRESS NOTES
Chief Complaint   Patient presents with    1 Month Follow-Up    Diabetes       HPI: Patient is here today to follow-up diabetes hypertension some mild cognitive impairment and Parkinson's recently diagnosed.  They did see a benefit from Sinemet and are following up with [facility].  [facility] also had placed him on benazepril.  Discomfort in rectum area - irritation with BMs.  Still seems lethargic and seems depressed.  BS still up some post meals.     Past Medical History:   Diagnosis Date    BiPAP (biphasic positive airway pressure) dependence     Cerebrovascular disease 2017    Chronic kidney disease 2004    Depression     Diabetes (HCC)     Hearing loss     Hyperlipidemia     Hypersomnia     Hypertension     Mild cognitive impairment     MARYANA (obstructive sleep apnea)     Osteoarthritis     Stage 3 chronic kidney disease (HCC)     Stroke ([facility])     Type 2 diabetes mellitus without complication ([facility]) 1998    Vascular disease        Past Surgical History:   Procedure Laterality Date    EYE SURGERY  2018    Cataracts temoval       Family History   Problem Relation Age of Onset    High Blood Pressure Mother     High Cholesterol Mother     Obesity Brother        Social History     Socioeconomic History    Marital status:      Spouse name: Not on file    Number of children: Not on file    Years of education: Not on file    Highest education level: Not on file   Occupational History    Not on file   Tobacco Use    Smoking status: Never    Smokeless tobacco: Never   Substance and Sexual Activity    Alcohol use: Not Currently    Drug use: Never    Sexual activity: Not Currently     Partners: Female   Other Topics Concern    Not on file   Social History Narrative    Not on file     Social Determinants of Health     Financial Resource Strain: Low Risk  (12/26/2023)    Overall Financial Resource Strain (CARDIA)     Difficulty of Paying Living Expenses: Not hard at all   Food Insecurity: No Food Insecurity

## 2024-02-08 ENCOUNTER — CARE COORDINATION (OUTPATIENT)
Dept: CARE COORDINATION | Age: 79
End: 2024-02-08

## 2024-02-08 NOTE — CARE COORDINATION
Attempted call for resource work.   No answer.   Left generic phone message on voicemail asking for a returned call.     ANATOLY EcholsHIDEANGELO  Care Coordination    [facility]   Cell: [phone]

## 2024-02-12 ENCOUNTER — CARE COORDINATION (OUTPATIENT)
Dept: CARE COORDINATION | Age: 79
End: 2024-02-12

## 2024-02-12 NOTE — CARE COORDINATION
Attempted call for resource work. Phone rings, no answer. Left a generic voicemail asking for a return call when available.     Will attempt another call later this week.     [facility]  Care Coordination    [facility]   Cell: [phone]

## 2024-02-14 ENCOUNTER — CARE COORDINATION (OUTPATIENT)
Dept: CARE COORDINATION | Age: 79
End: 2024-02-14

## 2024-02-14 NOTE — CARE COORDINATION
Attempted call to Carolina per PCP request for resource work. No answer. This is third attempt and messages have been left. Will route back to ACM RN.     ANATOLY EcholsHIDEANGELO  Care Coordination    [facility]   Cell: [phone]

## 2024-02-14 NOTE — TELEPHONE ENCOUNTER
I called his wife and she wants to talk with you and not any one else.  I did not have your phone number

## 2024-02-16 ENCOUNTER — CARE COORDINATION (OUTPATIENT)
Dept: CARE COORDINATION | Age: 79
End: 2024-02-16

## 2024-02-16 NOTE — CARE COORDINATION
ACM attempted contact with patient and unable to reach. Left voicemail with my cell number for call back. Will await return call from patient.        Guillremina Denis RN  Ambulatory Care Manager   C. [phone]

## 2024-02-19 ENCOUNTER — CARE COORDINATION (OUTPATIENT)
Dept: CARE COORDINATION | Age: 79
End: 2024-02-19

## 2024-02-19 NOTE — CARE COORDINATION
Patient's friend/caregiver returned call today for resource discussion. She is interested in options for care giving support or respite care. She is ex-spouse but the two do currently reside together. Patient completes most ADLs on his own but has a history of falls- does not prepare his own meals- and needs prompting for some things like waking up, medications, etc. She feels like patient benefits most from 24 hour support person. They had been referred to [facility] but it sounds like patient did not qualify for Medicaid and likely will not qualify for most of their resources. I will follow-up on that. Long discussion on phone today. Patient's caregiver is looking for a plan for a week-long respite like stay in the summer and then maybe a month later on in the year. Seems like there is a lot up in the air about long-term future plans. Discussion about private duty care, assisted living, and agency on aging resources on this call. I will compile some resources and follow-up with her later this week.     ANTOINETTE Echols  Care Coordination    [facility]   Cell: [phone]

## 2024-02-23 ENCOUNTER — CARE COORDINATION (OUTPATIENT)
Dept: CARE COORDINATION | Age: 79
End: 2024-02-23

## 2024-02-23 NOTE — CARE COORDINATION
Attempted call to Carolina for resource discussion. No answer. Phone rings. Left a message. Will try to get back with her early next week.     Amy Shi BSHIM  Care Coordination    [facility]   Cell: [phone]

## 2024-02-26 ENCOUNTER — CARE COORDINATION (OUTPATIENT)
Dept: CARE COORDINATION | Age: 79
End: 2024-02-26

## 2024-02-26 NOTE — CARE COORDINATION
Discussion today to provide resources to patient's friend/ supportive care person. She has asked about assisted living options and prices. I did get information back from two [place] options [facility] and one [place] option- [facility] and provided her with all of this information today. She does feel like [place] option [facility] [place] be much more of an option for patient as it was much more affordable. I also provided number for [place]  as she had some questions about what things might look like in the future in regards to income, alimony structure, etc should patient eventually need to apply for [place] Medicaid. I also provided her the number for two local [place] that she may speak with for assistance with [place] Medicaid application if they decide to do that down the line. Much is still uncertain about what their plans will be- she is looking for short term respite like stay with assisted living for late Spring or Summer and wanted general information. I will sign off at this time. I sent a secure email with discussed resources and she may reach back out with questions or additional needs.     ANATOLY EcholsHIDEANGELO  Care Coordination    [facility]   Cell: [phone]

## 2024-03-20 DIAGNOSIS — E34.9 TESTOSTERONE DEFICIENCY: ICD-10-CM

## 2024-03-20 DIAGNOSIS — Z12.5 SCREENING FOR PROSTATE CANCER: ICD-10-CM

## 2024-03-20 DIAGNOSIS — E11.21 TYPE 2 DIABETES MELLITUS WITH DIABETIC NEPHROPATHY, WITH LONG-TERM CURRENT USE OF INSULIN (HCC): ICD-10-CM

## 2024-03-20 DIAGNOSIS — Z79.4 TYPE 2 DIABETES MELLITUS WITH DIABETIC NEPHROPATHY, WITH LONG-TERM CURRENT USE OF INSULIN (HCC): ICD-10-CM

## 2024-03-20 LAB
ALBUMIN SERPL-MCNC: 3.9 G/DL (ref 3.5–5.2)
ALP SERPL-CCNC: 74 U/L (ref 40–130)
ALT SERPL-CCNC: <5 U/L (ref 5–41)
ANION GAP SERPL CALCULATED.3IONS-SCNC: 9 MMOL/L (ref 7–19)
AST SERPL-CCNC: 12 U/L (ref 5–40)
BILIRUB SERPL-MCNC: 0.5 MG/DL (ref 0.2–1.2)
BUN SERPL-MCNC: 32 MG/DL (ref 8–23)
CALCIUM SERPL-MCNC: 9.5 MG/DL (ref 8.8–10.2)
CHLORIDE SERPL-SCNC: 110 MMOL/L (ref 98–111)
CHOLEST SERPL-MCNC: 114 MG/DL (ref 160–199)
CO2 SERPL-SCNC: 27 MMOL/L (ref 22–29)
CREAT SERPL-MCNC: 1.8 MG/DL (ref 0.5–1.2)
ERYTHROCYTE [DISTWIDTH] IN BLOOD BY AUTOMATED COUNT: 11.9 % (ref 11.5–14.5)
GLUCOSE SERPL-MCNC: 115 MG/DL (ref 74–109)
HBA1C MFR BLD: 7.5 % (ref 4–6)
HCT VFR BLD AUTO: 51.7 % (ref 42–52)
HDLC SERPL-MCNC: 54 MG/DL (ref 55–121)
HGB BLD-MCNC: 16.2 G/DL (ref 14–18)
LDLC SERPL CALC-MCNC: 46 MG/DL
MCH RBC QN AUTO: 31.6 PG (ref 27–31)
MCHC RBC AUTO-ENTMCNC: 31.3 G/DL (ref 33–37)
MCV RBC AUTO: 100.8 FL (ref 80–94)
PLATELET # BLD AUTO: 148 K/UL (ref 130–400)
PMV BLD AUTO: 11 FL (ref 9.4–12.4)
POTASSIUM SERPL-SCNC: 4.5 MMOL/L (ref 3.5–5)
PROT SERPL-MCNC: 6.7 G/DL (ref 6.6–8.7)
PSA SERPL-MCNC: 0.5 NG/ML (ref 0–4)
RBC # BLD AUTO: 5.13 M/UL (ref 4.7–6.1)
SODIUM SERPL-SCNC: 146 MMOL/L (ref 136–145)
TRIGL SERPL-MCNC: 70 MG/DL (ref 0–149)
TSH SERPL DL<=0.005 MIU/L-ACNC: 3.1 UIU/ML (ref 0.27–4.2)
WBC # BLD AUTO: 5.7 K/UL (ref 4.8–10.8)

## 2024-03-21 ENCOUNTER — OFFICE VISIT (OUTPATIENT)
Dept: INTERNAL MEDICINE | Age: 79
End: 2024-03-21
Payer: COMMERCIAL

## 2024-03-21 VITALS
HEIGHT: 73 IN | DIASTOLIC BLOOD PRESSURE: 66 MMHG | OXYGEN SATURATION: 99 % | WEIGHT: 236 LBS | SYSTOLIC BLOOD PRESSURE: 118 MMHG | BODY MASS INDEX: 31.28 KG/M2 | HEART RATE: 88 BPM

## 2024-03-21 DIAGNOSIS — N18.32 STAGE 3B CHRONIC KIDNEY DISEASE (HCC): ICD-10-CM

## 2024-03-21 DIAGNOSIS — F01.50 VASCULAR DEMENTIA WITHOUT BEHAVIORAL DISTURBANCE, PSYCHOTIC DISTURBANCE, MOOD DISTURBANCE, OR ANXIETY, UNSPECIFIED DEMENTIA SEVERITY (HCC): ICD-10-CM

## 2024-03-21 DIAGNOSIS — I10 PRIMARY HYPERTENSION: ICD-10-CM

## 2024-03-21 DIAGNOSIS — E11.21 TYPE 2 DIABETES MELLITUS WITH DIABETIC NEPHROPATHY, WITH LONG-TERM CURRENT USE OF INSULIN (HCC): Primary | ICD-10-CM

## 2024-03-21 DIAGNOSIS — Z79.4 TYPE 2 DIABETES MELLITUS WITH DIABETIC NEPHROPATHY, WITH LONG-TERM CURRENT USE OF INSULIN (HCC): Primary | ICD-10-CM

## 2024-03-21 DIAGNOSIS — G47.33 OBSTRUCTIVE SLEEP APNEA: ICD-10-CM

## 2024-03-21 DIAGNOSIS — E03.8 OTHER SPECIFIED HYPOTHYROIDISM: ICD-10-CM

## 2024-03-21 PROCEDURE — 99214 OFFICE O/P EST MOD 30 MIN: CPT | Performed by: INTERNAL MEDICINE

## 2024-03-21 PROCEDURE — 1123F ACP DISCUSS/DSCN MKR DOCD: CPT | Performed by: INTERNAL MEDICINE

## 2024-03-21 PROCEDURE — 3074F SYST BP LT 130 MM HG: CPT | Performed by: INTERNAL MEDICINE

## 2024-03-21 PROCEDURE — 3078F DIAST BP <80 MM HG: CPT | Performed by: INTERNAL MEDICINE

## 2024-03-21 PROCEDURE — 3051F HG A1C>EQUAL 7.0%<8.0%: CPT | Performed by: INTERNAL MEDICINE

## 2024-03-21 NOTE — PROGRESS NOTES
uIU/mL   Hemoglobin A1C   Result Value Ref Range    Hemoglobin A1C 7.5 (H) 4.0 - 6.0 %   Comprehensive Metabolic Panel   Result Value Ref Range    Sodium 146 (H) 136 - 145 mmol/L    Potassium 4.5 3.5 - 5.0 mmol/L    Chloride 110 98 - 111 mmol/L    CO2 27 22 - 29 mmol/L    Anion Gap 9 7 - 19 mmol/L    Glucose 115 (H) 74 - 109 mg/dL    BUN 32 (H) 8 - 23 mg/dL    Creatinine 1.8 (H) 0.5 - 1.2 mg/dL    Est, Glom Filt Rate 38 (A) >60    Calcium 9.5 8.8 - 10.2 mg/dL    Total Protein 6.7 6.6 - 8.7 g/dL    Albumin 3.9 3.5 - 5.2 g/dL    Total Bilirubin 0.5 0.2 - 1.2 mg/dL    Alkaline Phosphatase 74 40 - 130 U/L    ALT <5 (A) 5 - 41 U/L    AST 12 5 - 40 U/L   CBC   Result Value Ref Range    WBC 5.7 4.8 - 10.8 K/uL    RBC 5.13 4.70 - 6.10 M/uL    Hemoglobin 16.2 14.0 - 18.0 g/dL    Hematocrit 51.7 42.0 - 52.0 %    .8 (H) 80.0 - 94.0 fL    MCH 31.6 (H) 27.0 - 31.0 pg    MCHC 31.3 (L) 33.0 - 37.0 g/dL    RDW 11.9 11.5 - 14.5 %    Platelets 148 130 - 400 K/uL    MPV 11.0 9.4 - 12.4 fL       ASSESSMENT/ PLAN:  1. Type 2 diabetes mellitus with diabetic nephropathy, with long-term current use of insulin (HCC)  Follow - I d/w pt and his wife to focus on long acting insulin   - Continuous Blood Gluc Sensor (DEXCOM G7 SENSOR) MISC; Use as directed and change every 10 days  Dispense: 3 each; Refill: 3  - Continuous Blood Gluc  (DEXCOM G7 ) VIVI; Use as directed with dexcom 7 reciever and transmitter  Dispense: 3 each; Refill: 3    2. Vascular dementia without behavioral disturbance, psychotic disturbance, mood disturbance, or anxiety, unspecified dementia severity (HCC)  I reviewed [facility] record and renewed aricept and we will continue to follow   - donepezil (ARICEPT) 5 MG tablet; Take 1 tablet by mouth nightly  Dispense: 90 tablet; Refill: 3    3. Other specified hypothyroidism  Renewed synthroid and follow up   - levothyroxine (SYNTHROID) 88 MCG tablet; Take 1 tablet by mouth daily  Dispense: 90 tablet;

## 2024-03-22 LAB
SHBG SERPL-SCNC: 31.3 PG/ML (ref 47–244)
SHBG SERPL-SCNC: 65 NMOL/L (ref 19–76)
TESTOST SERPL-MCNC: 257 NG/DL (ref 193–740)

## 2024-04-01 DIAGNOSIS — E11.21 TYPE 2 DIABETES MELLITUS WITH DIABETIC NEPHROPATHY, WITH LONG-TERM CURRENT USE OF INSULIN (HCC): ICD-10-CM

## 2024-04-01 DIAGNOSIS — Z79.4 TYPE 2 DIABETES MELLITUS WITH DIABETIC NEPHROPATHY, WITH LONG-TERM CURRENT USE OF INSULIN (HCC): ICD-10-CM

## 2024-04-01 PROBLEM — R41.82 ALTERED MENTAL STATUS: Status: RESOLVED | Noted: 2019-06-08 | Resolved: 2024-04-01

## 2024-04-01 RX ORDER — ACYCLOVIR 400 MG/1
TABLET ORAL
Qty: 3 EACH | Refills: 3 | Status: SHIPPED | OUTPATIENT
Start: 2024-04-01

## 2024-04-01 RX ORDER — DONEPEZIL HYDROCHLORIDE 5 MG/1
5 TABLET, FILM COATED ORAL NIGHTLY
Qty: 90 TABLET | Refills: 3 | Status: SHIPPED | OUTPATIENT
Start: 2024-04-01

## 2024-04-01 RX ORDER — INSULIN DEGLUDEC 200 U/ML
INJECTION, SOLUTION SUBCUTANEOUS
Qty: 9 ML | Refills: 0 | Status: SHIPPED | OUTPATIENT
Start: 2024-04-01

## 2024-04-01 RX ORDER — LEVOTHYROXINE SODIUM 88 UG/1
88 TABLET ORAL DAILY
Qty: 90 TABLET | Refills: 3 | Status: SHIPPED | OUTPATIENT
Start: 2024-04-01

## 2024-04-08 ENCOUNTER — TELEPHONE (OUTPATIENT)
Dept: HEMATOLOGY | Age: 79
End: 2024-04-08

## 2024-04-08 NOTE — TELEPHONE ENCOUNTER
Left detailed vm about patient's appt on 04/11/24. I also left call back number in case patient needs to reschedule.

## 2024-04-10 NOTE — PROGRESS NOTES
Progress Note      Pt Name: Eliseo Byrd  YOB: 1945  MRN: [id number]    Date of evaluation: 4/11/2024  History Obtained From:  patient, Carolina, electronic medical record    CHIEF COMPLAINT:    Chief Complaint   Patient presents with    Follow-up     Personal history of DVT (deep vein thrombosis)     Current active problems  Chronic DVT BLE  Gammopathy    HISTORY OF PRESENT ILLNESS:    Eliseo Byrd is a 78 y.o.  male followed in the office since 8/29/2023 because of history of bilateral lower extremity DVT.  These occurred back in 2013.  He is now on Xarelto 20 mg daily due to renal function.  He is on folic acid 1 mg daily for elevated serum homocystine level - needs a refill.     He continues on Sinemet 25/100 3 times daily for his Parkinson's.  He is on atorvastatin 40 mg daily for cholesterol.  He is diabetic, on insulin.  Hypothyroidism currently on levothyroxine 137 mcg daily.  Blood pressure controlled with losartan 50 mg daily.  He takes vitamin D 50,000 units weekly.  He is on Lexapro 20 mg daily.      HEMATOLOGY HISTORY: Bilateral lower extremity DVT  Eliseo was seen in initial hematology consultation on 8/29/2023 referred from Dr. Jan Maier for evaluation of  Coagulation Defect, Unspecified Hx of Blood Clots.     Eliseo has history of DVT of the bilateral lower extremities dating back to at least 2013.  He was treated previously on warfarin and is currently on Xarelto 20 mg daily.  Has been diagnosed with Parkinson's disease-ischemic component and is under continuation at [facility].  They are potentially going to try antiplatelet therapy.  Unfortunately one of his issues he has poor balance related to his Parkinson's and he would be at risk of bleeding being on both Xarelto and antiplatelet therapy     Venous US [facility] 10/18/2013  Obstructing thrombus in the left common femoral vein. The remaining deep veins in the LLE are otherwise normal and patent.  Obstructing thrombus throughout

## 2024-04-11 ENCOUNTER — OFFICE VISIT (OUTPATIENT)
Dept: HEMATOLOGY | Age: 79
End: 2024-04-11
Payer: COMMERCIAL

## 2024-04-11 ENCOUNTER — CLINICAL DOCUMENTATION (OUTPATIENT)
Dept: HEMATOLOGY | Age: 79
End: 2024-04-11

## 2024-04-11 ENCOUNTER — HOSPITAL ENCOUNTER (OUTPATIENT)
Dept: INFUSION THERAPY | Age: 79
Discharge: HOME | End: 2024-04-11
Payer: COMMERCIAL

## 2024-04-11 VITALS
OXYGEN SATURATION: 97 % | WEIGHT: 235 LBS | HEIGHT: 73 IN | DIASTOLIC BLOOD PRESSURE: 70 MMHG | HEART RATE: 92 BPM | BODY MASS INDEX: 31.14 KG/M2 | SYSTOLIC BLOOD PRESSURE: 114 MMHG | TEMPERATURE: 97.8 F

## 2024-04-11 DIAGNOSIS — D47.2 GAMMOPATHY: ICD-10-CM

## 2024-04-11 DIAGNOSIS — Z86.718 PERSONAL HISTORY OF DVT (DEEP VEIN THROMBOSIS): Primary | ICD-10-CM

## 2024-04-11 DIAGNOSIS — N18.32 CHRONIC RENAL FAILURE, STAGE 3B (HCC): ICD-10-CM

## 2024-04-11 DIAGNOSIS — Z86.718 PERSONAL HISTORY OF DVT (DEEP VEIN THROMBOSIS): ICD-10-CM

## 2024-04-11 DIAGNOSIS — I82.512 CHRONIC DEEP VEIN THROMBOSIS (DVT) OF FEMORAL VEIN OF LEFT LOWER EXTREMITY (HCC): ICD-10-CM

## 2024-04-11 DIAGNOSIS — I82.531 CHRONIC DEEP VEIN THROMBOSIS (DVT) OF POPLITEAL VEIN OF RIGHT LOWER EXTREMITY (HCC): ICD-10-CM

## 2024-04-11 LAB
BASOPHILS # BLD: 0.04 K/UL (ref 0.01–0.08)
BASOPHILS NFR BLD: 0.9 % (ref 0.1–1.2)
EOSINOPHIL # BLD: 0.11 K/UL (ref 0.04–0.54)
EOSINOPHIL NFR BLD: 2.3 % (ref 0.7–7)
ERYTHROCYTE [DISTWIDTH] IN BLOOD BY AUTOMATED COUNT: 12.1 % (ref 11.6–14.4)
HCT VFR BLD AUTO: 47.2 % (ref 40.1–51)
HGB BLD-MCNC: 15.5 G/DL (ref 13.7–17.5)
LDH SERPL-CCNC: 162 U/L (ref 120–246)
LYMPHOCYTES # BLD: 1.26 K/UL (ref 1.18–3.74)
LYMPHOCYTES NFR BLD: 26.8 % (ref 19.3–53.1)
MCH RBC QN AUTO: 32.2 PG (ref 25.7–32.2)
MCHC RBC AUTO-ENTMCNC: 32.8 G/DL (ref 32.3–36.5)
MCV RBC AUTO: 98.1 FL (ref 79–92.2)
MONOCYTES # BLD: 0.41 K/UL (ref 0.24–0.82)
MONOCYTES NFR BLD: 8.7 % (ref 4.7–12.5)
NEUTROPHILS # BLD: 2.86 K/UL (ref 1.56–6.13)
NEUTS SEG NFR BLD: 60.9 % (ref 34–71.1)
PLATELET # BLD AUTO: 160 K/UL (ref 163–337)
PMV BLD AUTO: 10.6 FL (ref 7.4–10.4)
RBC # BLD AUTO: 4.81 M/UL (ref 4.63–6.08)
WBC # BLD AUTO: 4.7 K/UL (ref 4.23–9.07)

## 2024-04-11 PROCEDURE — 36415 COLL VENOUS BLD VENIPUNCTURE: CPT

## 2024-04-11 PROCEDURE — 85025 COMPLETE CBC W/AUTO DIFF WBC: CPT

## 2024-04-11 PROCEDURE — 1123F ACP DISCUSS/DSCN MKR DOCD: CPT | Performed by: PHYSICIAN ASSISTANT

## 2024-04-11 PROCEDURE — 99212 OFFICE O/P EST SF 10 MIN: CPT

## 2024-04-11 PROCEDURE — 3074F SYST BP LT 130 MM HG: CPT | Performed by: PHYSICIAN ASSISTANT

## 2024-04-11 PROCEDURE — 3078F DIAST BP <80 MM HG: CPT | Performed by: PHYSICIAN ASSISTANT

## 2024-04-11 PROCEDURE — 99213 OFFICE O/P EST LOW 20 MIN: CPT | Performed by: PHYSICIAN ASSISTANT

## 2024-04-11 PROCEDURE — 83615 LACTATE (LD) (LDH) ENZYME: CPT

## 2024-04-11 RX ORDER — FOLIC ACID 1 MG/1
1 TABLET ORAL DAILY
Qty: 90 TABLET | Refills: 3 | Status: SHIPPED | OUTPATIENT
Start: 2024-04-11

## 2024-04-11 ASSESSMENT — ENCOUNTER SYMPTOMS
SORE THROAT: 0
VOMITING: 0
NAUSEA: 0
EYE ITCHING: 0
ABDOMINAL DISTENTION: 0
COUGH: 0
SHORTNESS OF BREATH: 0
VOICE CHANGE: 0
DIARRHEA: 0
CONSTIPATION: 0
COLOR CHANGE: 0
ABDOMINAL PAIN: 0
PHOTOPHOBIA: 0
WHEEZING: 0
TROUBLE SWALLOWING: 0
EYE DISCHARGE: 0
BLOOD IN STOOL: 0
BACK PAIN: 0

## 2024-04-11 NOTE — PROGRESS NOTES
Diana Alfonso CSW was stopped by patients caregiver / ex wife while they were in office for an appointment. Carolina stated she had called this SW multiple times and did not receive a call back. This SW apologized as this SW had no knowledge of these calls. SW immediately reported the incident to practice manager Mike Steele.

## 2024-04-14 LAB
ALBUMIN SERPL-MCNC: 3.92 G/DL (ref 3.75–5.01)
ALPHA1 GLOB SERPL ELPH-MCNC: 0.25 G/DL (ref 0.19–0.46)
ALPHA2 GLOB SERPL ELPH-MCNC: 0.63 G/DL (ref 0.48–1.05)
B-GLOBULIN SERPL ELPH-MCNC: 0.78 G/DL (ref 0.48–1.1)
DEPRECATED KAPPA LC FREE/LAMBDA SER: 2.03 {RATIO} (ref 0.26–1.65)
EER MONOCLONAL PROTEIN AND FLC, SERUM: ABNORMAL
GAMMA GLOB SERPL ELPH-MCNC: 0.92 G/DL (ref 0.62–1.51)
IGA SERPL-MCNC: 300 MG/DL (ref 68–408)
IGG SERPL-MCNC: 975 MG/DL (ref 768–1632)
IGM SERPL-MCNC: 94 MG/DL (ref 35–263)
INTERPRETATION SERPL IFE-IMP: ABNORMAL
INTERPRETATION SERPL IFE-IMP: ABNORMAL
KAPPA LC FREE SER-MCNC: 49.51 MG/L (ref 3.3–19.4)
LAMBDA LC FREE SERPL-MCNC: 24.33 MG/L (ref 5.71–26.3)
MONOCLONAL PROTEIN, SERUM: ABNORMAL G/DL
PROT SERPL-MCNC: 6.5 G/DL (ref 6.3–8.2)

## 2024-05-30 DIAGNOSIS — E11.21 TYPE 2 DIABETES MELLITUS WITH DIABETIC NEPHROPATHY, WITH LONG-TERM CURRENT USE OF INSULIN (HCC): ICD-10-CM

## 2024-05-30 DIAGNOSIS — Z79.4 TYPE 2 DIABETES MELLITUS WITH DIABETIC NEPHROPATHY, WITH LONG-TERM CURRENT USE OF INSULIN (HCC): ICD-10-CM

## 2024-05-30 RX ORDER — ACYCLOVIR 400 MG/1
TABLET ORAL
Qty: 3 EACH | Refills: 3 | Status: SHIPPED | OUTPATIENT
Start: 2024-05-30

## 2024-06-21 ENCOUNTER — OFFICE VISIT (OUTPATIENT)
Dept: INTERNAL MEDICINE | Age: 79
End: 2024-06-21
Payer: COMMERCIAL

## 2024-06-21 VITALS
WEIGHT: 228 LBS | BODY MASS INDEX: 30.22 KG/M2 | HEART RATE: 108 BPM | DIASTOLIC BLOOD PRESSURE: 52 MMHG | HEIGHT: 73 IN | OXYGEN SATURATION: 95 % | SYSTOLIC BLOOD PRESSURE: 110 MMHG

## 2024-06-21 DIAGNOSIS — E11.21 TYPE 2 DIABETES MELLITUS WITH DIABETIC NEPHROPATHY, WITH LONG-TERM CURRENT USE OF INSULIN (HCC): Primary | ICD-10-CM

## 2024-06-21 DIAGNOSIS — Z79.4 TYPE 2 DIABETES MELLITUS WITH DIABETIC NEPHROPATHY, WITH LONG-TERM CURRENT USE OF INSULIN (HCC): Primary | ICD-10-CM

## 2024-06-21 DIAGNOSIS — E11.21 TYPE 2 DIABETES MELLITUS WITH DIABETIC NEPHROPATHY, WITH LONG-TERM CURRENT USE OF INSULIN (HCC): ICD-10-CM

## 2024-06-21 DIAGNOSIS — E03.8 OTHER SPECIFIED HYPOTHYROIDISM: ICD-10-CM

## 2024-06-21 DIAGNOSIS — Z79.4 TYPE 2 DIABETES MELLITUS WITH DIABETIC NEPHROPATHY, WITH LONG-TERM CURRENT USE OF INSULIN (HCC): ICD-10-CM

## 2024-06-21 DIAGNOSIS — E55.9 VITAMIN D DEFICIENCY: ICD-10-CM

## 2024-06-21 DIAGNOSIS — E29.1 HYPOGONADISM IN MALE: ICD-10-CM

## 2024-06-21 DIAGNOSIS — R26.89 POOR BALANCE: ICD-10-CM

## 2024-06-21 DIAGNOSIS — R53.1 WEAKNESS: ICD-10-CM

## 2024-06-21 DIAGNOSIS — G21.4 VASCULAR PARKINSONISM (HCC): ICD-10-CM

## 2024-06-21 PROBLEM — F33.2 SEVERE EPISODE OF RECURRENT MAJOR DEPRESSIVE DISORDER, WITHOUT PSYCHOTIC FEATURES (HCC): Status: RESOLVED | Noted: 2019-06-08 | Resolved: 2024-06-21

## 2024-06-21 LAB
25(OH)D3 SERPL-MCNC: 77.2 NG/ML
ALBUMIN SERPL-MCNC: 3.9 G/DL (ref 3.5–5.2)
ALP SERPL-CCNC: 72 U/L (ref 40–130)
ALT SERPL-CCNC: 6 U/L (ref 5–41)
ANION GAP SERPL CALCULATED.3IONS-SCNC: 12 MMOL/L (ref 7–19)
AST SERPL-CCNC: 15 U/L (ref 5–40)
BILIRUB SERPL-MCNC: 0.7 MG/DL (ref 0.2–1.2)
BUN SERPL-MCNC: 37 MG/DL (ref 8–23)
CALCIUM SERPL-MCNC: 9.7 MG/DL (ref 8.8–10.2)
CHLORIDE SERPL-SCNC: 106 MMOL/L (ref 98–111)
CO2 SERPL-SCNC: 22 MMOL/L (ref 22–29)
CREAT SERPL-MCNC: 1.8 MG/DL (ref 0.5–1.2)
ERYTHROCYTE [DISTWIDTH] IN BLOOD BY AUTOMATED COUNT: 12.8 % (ref 11.5–14.5)
GLUCOSE SERPL-MCNC: 218 MG/DL (ref 74–109)
HBA1C MFR BLD: 8.1 % (ref 4–6)
HCT VFR BLD AUTO: 45.9 % (ref 42–52)
HGB BLD-MCNC: 14.8 G/DL (ref 14–18)
MCH RBC QN AUTO: 32.8 PG (ref 27–31)
MCHC RBC AUTO-ENTMCNC: 32.2 G/DL (ref 33–37)
MCV RBC AUTO: 101.8 FL (ref 80–94)
PLATELET # BLD AUTO: 152 K/UL (ref 130–400)
PMV BLD AUTO: 11.2 FL (ref 9.4–12.4)
POTASSIUM SERPL-SCNC: 4.9 MMOL/L (ref 3.5–5)
PROT SERPL-MCNC: 6.7 G/DL (ref 6.6–8.7)
RBC # BLD AUTO: 4.51 M/UL (ref 4.7–6.1)
SODIUM SERPL-SCNC: 140 MMOL/L (ref 136–145)
T4 FREE SERPL-MCNC: 1.19 NG/DL (ref 0.93–1.7)
TSH SERPL DL<=0.005 MIU/L-ACNC: 7.41 UIU/ML (ref 0.27–4.2)
WBC # BLD AUTO: 5.8 K/UL (ref 4.8–10.8)

## 2024-06-21 PROCEDURE — 3052F HG A1C>EQUAL 8.0%<EQUAL 9.0%: CPT | Performed by: INTERNAL MEDICINE

## 2024-06-21 PROCEDURE — 99214 OFFICE O/P EST MOD 30 MIN: CPT | Performed by: INTERNAL MEDICINE

## 2024-06-21 PROCEDURE — 1123F ACP DISCUSS/DSCN MKR DOCD: CPT | Performed by: INTERNAL MEDICINE

## 2024-06-21 PROCEDURE — 3074F SYST BP LT 130 MM HG: CPT | Performed by: INTERNAL MEDICINE

## 2024-06-21 PROCEDURE — 3078F DIAST BP <80 MM HG: CPT | Performed by: INTERNAL MEDICINE

## 2024-06-21 RX ORDER — LOSARTAN POTASSIUM 50 MG/1
25 TABLET ORAL DAILY
Qty: 45 TABLET | Refills: 3
Start: 2024-06-21

## 2024-06-21 RX ORDER — ATORVASTATIN CALCIUM 40 MG/1
40 TABLET, FILM COATED ORAL DAILY
Qty: 90 TABLET | Refills: 3 | Status: SHIPPED | OUTPATIENT
Start: 2024-06-21

## 2024-06-21 NOTE — PROGRESS NOTES
cream Apply bid and prn post bm 28 g 0    Testosterone Cypionate 200 MG/ML SOLN 1 mL every 21 days Max Daily Amount: 1 mL      carbidopa-levodopa (SINEMET)  MG per tablet Take 2 tablets by mouth 3 times daily      vitamin D (ERGOCALCIFEROL) 1.25 MG (02884 UT) CAPS capsule Take 1 capsule by mouth once a week      rivaroxaban (XARELTO) 20 MG TABS tablet Take 1 tablet by mouth daily      escitalopram (LEXAPRO) 20 MG tablet        No current facility-administered medications for this visit.       Review of Systems    BP (!) 110/52   Pulse (!) 108   Ht 1.854 m (6' 1\")   Wt 103.4 kg (228 lb)   SpO2 95%   BMI 30.08 kg/m²   BP Readings from Last 7 Encounters:   06/21/24 (!) 110/52   04/11/24 114/70   03/21/24 118/66   01/30/24 118/68   12/26/23 128/70   10/11/23 120/72   08/29/23 98/64     Wt Readings from Last 7 Encounters:   06/21/24 103.4 kg (228 lb)   04/11/24 106.6 kg (235 lb)   03/21/24 107 kg (236 lb)   01/30/24 107 kg (236 lb)   12/26/23 108.9 kg (240 lb)   10/11/23 104.5 kg (230 lb 4.8 oz)   08/29/23 104.3 kg (230 lb)     BMI Readings from Last 7 Encounters:   06/21/24 30.08 kg/m²   04/11/24 31.00 kg/m²   03/21/24 31.14 kg/m²   01/30/24 31.14 kg/m²   12/26/23 31.66 kg/m²   10/11/23 30.38 kg/m²   08/29/23 30.34 kg/m²     Resp Readings from Last 7 Encounters:   No data found for Resp       Physical Exam  Constitutional:       General: He is not in acute distress.  Eyes:      General: No scleral icterus.  Cardiovascular:      Heart sounds: Normal heart sounds.   Pulmonary:      Breath sounds: Normal breath sounds.   Musculoskeletal:      Cervical back: Neck supple.   Lymphadenopathy:      Cervical: No cervical adenopathy.   Skin:     Findings: No rash.   Neurological:      Comments: Slow speech slow responses quiet   Psychiatric:         Mood and Affect: Mood normal.         Results for orders placed or performed during the hospital encounter of 04/11/24   CBC with Auto Differential   Result Value Ref Range

## 2024-06-25 DIAGNOSIS — E11.21 TYPE 2 DIABETES MELLITUS WITH DIABETIC NEPHROPATHY, WITH LONG-TERM CURRENT USE OF INSULIN (HCC): ICD-10-CM

## 2024-06-25 DIAGNOSIS — E03.8 OTHER SPECIFIED HYPOTHYROIDISM: ICD-10-CM

## 2024-06-25 DIAGNOSIS — Z79.4 TYPE 2 DIABETES MELLITUS WITH DIABETIC NEPHROPATHY, WITH LONG-TERM CURRENT USE OF INSULIN (HCC): ICD-10-CM

## 2024-06-25 LAB
SHBG SERPL-SCNC: 6.3 PG/ML (ref 47–244)
SHBG SERPL-SCNC: 83 NMOL/L (ref 19–76)
TESTOST SERPL-MCNC: 66 NG/DL (ref 193–740)

## 2024-06-25 RX ORDER — INSULIN DEGLUDEC 200 U/ML
14 INJECTION, SOLUTION SUBCUTANEOUS NIGHTLY
Qty: 9 ML | Refills: 0 | Status: SHIPPED | OUTPATIENT
Start: 2024-06-25

## 2024-06-25 RX ORDER — LEVOTHYROXINE SODIUM 0.1 MG/1
100 TABLET ORAL DAILY
Qty: 90 TABLET | Refills: 3 | Status: SHIPPED | OUTPATIENT
Start: 2024-06-25

## 2024-07-10 ENCOUNTER — HOSPITAL ENCOUNTER (OUTPATIENT)
Dept: PHYSICAL THERAPY | Age: 79
Setting detail: THERAPIES SERIES
Discharge: HOME | End: 2024-07-10
Payer: COMMERCIAL

## 2024-07-10 VITALS — SYSTOLIC BLOOD PRESSURE: 118 MMHG | OXYGEN SATURATION: 96 % | HEART RATE: 84 BPM | DIASTOLIC BLOOD PRESSURE: 75 MMHG

## 2024-07-10 PROCEDURE — 97530 THERAPEUTIC ACTIVITIES: CPT

## 2024-07-10 PROCEDURE — 97162 PT EVAL MOD COMPLEX 30 MIN: CPT

## 2024-07-10 ASSESSMENT — 10 METER WALK TEST (10METWT)
AVERAGE VELOCITY - METERS PER SECOND: 1.05
TRIAL 1: TIME TO WALK 10 METERS: 5.71
AVERAGE VELOCITY: 5.7

## 2024-07-10 NOTE — PROGRESS NOTES
Standing in corner, ballon taps, 1-3 min                          Therapy Time  Individual Time In: 1500       Individual Time Out: 1600  Minutes: 60  Timed Code Treatment Minutes: 60 Minutes     Therapist Signature: Benjamin Finch PT    Date: 7/10/2024     I certify that the above Therapy Services are being furnished while the patient is under my care. I agree with the treatment plan and certify that this therapy is necessary.      Physician's Signature:  ___________________________   Date:_______                                                                   Adore Thomas MD        Physician Comments: _______________________________________________    Please sign and return to [facility].  Please fax to the location listed below. THANK YOU for this referral!    [facility]  [address] [place] [id number]  Dept: [phone]  Dept Fax: [phone]  Loc: [phone]       POC NOTE

## 2024-07-11 ENCOUNTER — HOSPITAL ENCOUNTER (OUTPATIENT)
Dept: PHYSICAL THERAPY | Age: 79
Setting detail: THERAPIES SERIES
Discharge: HOME | End: 2024-07-11
Payer: COMMERCIAL

## 2024-07-11 PROCEDURE — 97110 THERAPEUTIC EXERCISES: CPT

## 2024-07-11 NOTE — PROGRESS NOTES
Walk Test from   1.01 m/sec to   1.2 m/sec.  Short Term Goal 4: Patient will perform floor transfer in 2 min with min assist of 1.  Short Term Goal 5: Patient will perform HEP with min cuing.  Long Term Goals  Time Frame for Long Term Goals : 6-8 weeks  Long Term Goal 1: Patient will increase LE strength in order to reduce fall risk as demonstrated by improvement of 5 Times Sit to Stand from unable to 5 reps in15 sec.  Long Term Goal 2: Patient will demonstrate improvement of functional balance with improvement of Brown Balance Scale from 37/56 to >50/56.  Long Term Goal 3: Patient will improve functional ambulation and reduce fall risk as demonstrated by improvement of 6 Meter Walk Test from 1.05 m/sec to >1.2 m/sec.  Long Term Goal 4: Patient will perform floor transfer in 2 min with min assist of 1.  Long Term Goal 5: Patient will perform HEP with no cuing.  Patient Goals   Patient Goals : stronger    Plan:    Physical Therapy Plan  Plan weeks: 6-8 wks  Current Treatment Recommendations: Strengthening, Balance training, Functional mobility training, Transfer training, ADL/Self-care training, Stair training, Wheelchair mobility training, Endurance training, Neuromuscular re-education, Home exercise program, Therapeutic activities      Therapy Time:   Individual Concurrent Group Co-treatment   Time In 1600         Time Out 1655         Minutes 55            Pravin Estrada PTA   Electronically signed by Pravin Estrada PTA on 7/11/2024 at 5:06 PM

## 2024-07-17 ENCOUNTER — HOSPITAL ENCOUNTER (OUTPATIENT)
Dept: PHYSICAL THERAPY | Age: 79
Setting detail: THERAPIES SERIES
Discharge: HOME | End: 2024-07-17
Payer: COMMERCIAL

## 2024-07-17 PROCEDURE — 97110 THERAPEUTIC EXERCISES: CPT

## 2024-07-17 NOTE — PROGRESS NOTES
sec.  Long Term Goal 2: Patient will demonstrate improvement of functional balance with improvement of Brown Balance Scale from 37/56 to >50/56.  Long Term Goal 3: Patient will improve functional ambulation and reduce fall risk as demonstrated by improvement of 6 Meter Walk Test from 1.05 m/sec to >1.2 m/sec.  Long Term Goal 4: Patient will perform floor transfer in 2 min with min assist of 1.  Long Term Goal 5: Patient will perform HEP with no cuing.  Patient Goals   Patient Goals : stronger    Plan:    Physical Therapy Plan  Plan weeks: 6-8 wks  Current Treatment Recommendations: Strengthening, Balance training, Functional mobility training, Transfer training, ADL/Self-care training, Stair training, Wheelchair mobility training, Endurance training, Neuromuscular re-education, Home exercise program, Therapeutic activities      Therapy Time:   Individual Concurrent Group Co-treatment   Time In 1304         Time Out 1400         Minutes 56            Pravin Estrada PTA   Electronically signed by Pravin Estrada PTA on 7/17/2024 at 3:14 PM

## 2024-07-18 NOTE — PROGRESS NOTES
[facility] - PODIATRY    Today's Date: 07/26/24    Patient Name: Haseeb Joshua  MRN: [id number]  CSN: [id number]  PCP: Daniel Bennett DO  Referring Provider: No ref. provider found    SUBJECTIVE     Chief Complaint   Patient presents with    Follow-up     Daniel Bennett DO  1 YR DIABETIC FOOT EXAM-pt states he is here today for diabetic foot exam-pt denies pain    Diabetes     148 mg/dl BG     HPI: Haseeb Joshua, a 78 y.o.male, comes to clinic as a(n) established patient presenting for diabetic foot exam and complaining of painful toenails. Patient has h/o CKD, diabetes, DVT, hypertension, hypothyroidism, CVA . Patient is IDDM with last stated BG level of 148mg/dl.  She presents today for diabetic foot exam and with complaints of thickened, irregular toenail growth of nails of both feet. Denies significant numbness or tingling in feet.  Denies open wounds or sores today.  Interested in resuming routine diabetic foot nail care services.  Denies pain. Relates previous treatment(s) including care by podiatry . Denies any constitutional symptoms. No other pedal complaints at this time.    Past Medical History:   Diagnosis Date    Chronic kidney disease     stage 3    Diabetes mellitus     DVT (deep venous thrombosis)     Hypertension     Hypothyroidism     Parkinsonism     Stroke 09/09/2016    PCA distribution stroke, left     History reviewed. No pertinent surgical history.  Family History   Problem Relation Age of Onset    Diabetes Mother     Diabetes Brother      Social History     Socioeconomic History    Marital status:    Tobacco Use    Smoking status: Never     Passive exposure: Never    Smokeless tobacco: Never   Vaping Use    Vaping status: Never Used   Substance and Sexual Activity    Alcohol use: No    Drug use: Never    Sexual activity: Not Currently     Partners: Female     No Known Allergies  Current Outpatient Medications   Medication Sig Dispense Refill    ACCU-CHEK ANITRA  PLUS test strip       atorvastatin (LIPITOR) 10 MG tablet Take 1 tablet by mouth Daily.      calcitriol (ROCALTROL) 0.25 MCG capsule Take 1 capsule by mouth Daily.      levothyroxine (SYNTHROID, LEVOTHROID) 100 MCG tablet Take 1 tablet by mouth Daily.      lisinopril (PRINIVIL,ZESTRIL) 20 MG tablet Take 1 tablet by mouth Daily.      losartan (COZAAR) 100 MG tablet       lovastatin (MEVACOR) 20 MG tablet       NovoLOG FlexPen 100 UNIT/ML solution pen-injector sc pen       rivaroxaban (XARELTO) 20 MG tablet Take 1 tablet by mouth Daily.      Arginine 500 MG capsule Take 500 mg by mouth 2 (two) times a day.      Arginine 600 MG capsule Take 600 mg by mouth daily. (Patient not taking: Reported on 7/26/2024)      ARIPiprazole (ABILIFY) 2 MG tablet       glimepiride (AMARYL) 4 MG tablet Take 1 tablet by mouth 2 (Two) Times a Day.      insulin glargine (LANTUS, SEMGLEE) 100 UNIT/ML injection Inject  under the skin into the appropriate area as directed Daily.      mirtazapine (REMERON) 15 MG tablet        No current facility-administered medications for this visit.     Review of Systems   Constitutional:  Negative for chills and fever.   HENT:  Negative for congestion.    Respiratory:  Negative for shortness of breath.    Cardiovascular:  Negative for chest pain and leg swelling.   Gastrointestinal:  Negative for constipation, diarrhea, nausea and vomiting.   Musculoskeletal:  Negative for arthralgias, gait problem and myalgias.   Skin:  Negative for wound.        Thickened nails   Neurological:  Negative for numbness.       OBJECTIVE     Vitals:    07/26/24 1129   BP: 118/76   Pulse: 69   SpO2: 97%         PHYSICAL EXAM  GEN:   Accompanied by none.     Foot/Ankle Exam    GENERAL  Diabetic foot exam performed    Appearance:  appears stated age  Orientation:  AAOx3  Affect:  appropriate  Assistance:  independent  Right shoe gear: casual shoe  Left shoe gear: casual shoe    VASCULAR     Right Foot Vascularity   Dorsalis  pedis:  2+  Posterior tibial:  2+  Skin temperature:  warm  Edema grading:  None  CFT:  3  Pedal hair growth:  Present  Varicosities:  none     Left Foot Vascularity   Dorsalis pedis:  2+  Posterior tibial:  2+  Skin temperature:  warm  Edema grading:  None  CFT:  3  Pedal hair growth:  Present  Varicosities:  none     NEUROLOGIC     Right Foot Neurologic   Normal sensation    Light touch sensation: normal  Vibratory sensation: normal  Hot/Cold sensation: normal  Protective Sensation using Whitehouse-Shelbi Monofilament:   Sites intact: 10  Sites tested: 10     Left Foot Neurologic   Normal sensation    Light touch sensation: normal  Vibratory sensation: normal  Hot/Cold sensation:  normal  Protective Sensation using Whitehouse-Shelbi Monofilament:   Sites intact: 10  Sites tested: 10    MUSCULOSKELETAL     Right Foot Musculoskeletal   Ecchymosis:  none  Tenderness:  none    Arch:  Normal     Left Foot Musculoskeletal   Ecchymosis:  none  Tenderness:  none  Arch:  Normal    MUSCLE STRENGTH     Right Foot Muscle Strength   Foot dorsiflexion:  5  Foot plantar flexion:  5  Foot inversion:  5  Foot eversion:  5     Left Foot Muscle Strength   Foot dorsiflexion:  5  Foot plantar flexion:  5  Foot inversion:  5  Foot eversion:  5    RANGE OF MOTION     Right Foot Range of Motion   Foot and ankle ROM within normal limits       Left Foot Range of Motion   Foot and ankle ROM within normal limits      DERMATOLOGIC      Right Foot Dermatologic   Skin  Right foot skin is intact.   Nails  1.  Positive for elongated and abnormal thickness.  2.  Positive for elongated and abnormal thickness.  3.  Positive for elongated and abnormal thickness.  4.  Positive for elongated and abnormal thickness.  5.  Positive for elongated and abnormal thickness.     Left Foot Dermatologic   Skin  Left foot skin is intact.   Nails comment:  Onychogryphosis of hallux  Nails  1.  Positive for onychomycosis, abnormal thickness, subungual debris and  dystrophic nail.  2.  Positive for elongated and abnormal thickness.  3.  Positive for elongated, onychomycosis, abnormal thickness and dystrophic nail.  4.  Positive for elongated and abnormally thick.  5.  Positive for elongated.      RADIOLOGY/NUCLEAR:  No results found.    LABORATORY/CULTURE RESULTS:      PATHOLOGY RESULTS:       ASSESSMENT/PLAN     Diagnoses and all orders for this visit:    1. Onychogryphosis (Primary)    2. Type 2 diabetes mellitus with chronic kidney disease, with long-term current use of insulin, unspecified CKD stage    3. Encounter for diabetic foot exam    4. Hypertensive kidney disease, stage III        Comprehensive lower extremity examination and evaluation was performed.  Discussed findings and treatment plan including risks, benefits, and treatment options with patient in detail. Patient agreed with treatment plan.  DFE performed at today's visit.  After verbal consent obtained, nail(s) x10 debrided of length and thickness with nail nipper without incidence  Patient may maintain nails and calluses at home utilizing emery board or pumice stone between visits as needed  Reviewed at home diabetic foot care including daily foot checks   Continue control management of type II DM per PCP.  Patient interested in continuing with routine foot nail care services going forward.  Patient scheduled to return in 3 months.  Encouraged patient to call sooner with questions or concerns.  An After Visit Summary was printed and given to the patient at discharge, including (if requested) any available informative/educational handouts regarding diagnosis, treatment, or medications. All questions were answered to patient/family satisfaction. Should symptoms fail to improve or worsen they agree to call or return to clinic or to go to the Emergency Department. Discussed the importance of following up with any needed screening tests/labs/specialist appointments and any requested follow-up recommended by me  today. Importance of maintaining follow-up discussed and patient accepts that missed appointments can delay diagnosis and potentially lead to worsening of conditions.  Return in about 3 months (around 10/26/2024) for Follow-up with APRN, Follow-up in [facility] or sooner if acute issues arise.        This document has been electronically signed by SANDRA Flores on July 26, 2024 12:30 CDT

## 2024-07-19 ENCOUNTER — HOSPITAL ENCOUNTER (OUTPATIENT)
Dept: PHYSICAL THERAPY | Age: 79
Setting detail: THERAPIES SERIES
Discharge: HOME | End: 2024-07-19
Payer: COMMERCIAL

## 2024-07-19 VITALS — OXYGEN SATURATION: 97 % | HEART RATE: 74 BPM | DIASTOLIC BLOOD PRESSURE: 78 MMHG | SYSTOLIC BLOOD PRESSURE: 144 MMHG

## 2024-07-19 PROCEDURE — 97110 THERAPEUTIC EXERCISES: CPT

## 2024-07-19 NOTE — PROGRESS NOTES
raise, 10 reps  Exercise 14: Standing, //, narrow, PRIYA, EO/EC, 30 sec--not today  Exercise 15: Standing, //, semi-tandem, EO/EC, 30 sec--not today  Exercise 16: Standing next to mat table, transfer to all 4's, return to standing.  (progress to lying prone, rolling over, then to all 4's, the standing)--not today  Exercise 17: //, forw/back/side-, 3 trips--not today  Exercise 18: //, hurdles, forw/side-side, 3 trips each--not today  Exercise 19: Sitting, ballon taps, 1-3 min  (7/19/2024: 2 min)  Exercise 20: Standing in corner, ballon taps, 1-3 min--not today                          Pt Education:         ASSESSMENT     Assessment: Assessment: He denies pain during PT session.  He does become short of breath at end of 6 MWT and after sit to stand activity.  He performs ther ex as per flow sheet.  Body Structures, Functions, Activity Limitations Requiring Skilled Therapeutic Intervention: Decreased functional mobility , Decreased ADL status, Decreased strength, Decreased endurance, Decreased balance, Decreased high-level IADLs, Decreased posture    Post-Treatment Pain Level:      No data recorded  Therapy Prognosis: Good       GOALS   Patient Goals : stronger  Short Term Goals Completed by 3-4 weeks Current Status Goal Status   Patient will increase LE strength in order to reduce fall risk as demonstrated by improvement of 5 Times Sit to Stand from unable to 5 reps in 45 sec.       Patient will demonstrate improvement of functional balance with improvement of Brown Balance Scale from  37/56  to   45/56.       Patient will improve functional ambulation and reduce fall risk as demonstrated by improvement of 6 Meter Walk Test from   1.01 m/sec to   1.2 m/sec.       Patient will perform floor transfer in 2 min with min assist of 1.       Patient will perform HEP with min cuing.                                                   Long Term Goals Completed by 6-8 weeks Current Status Goal Status   Patient will increase LE  Awake/Alert/Cooperative

## 2024-07-23 ENCOUNTER — HOSPITAL ENCOUNTER (OUTPATIENT)
Dept: PHYSICAL THERAPY | Age: 79
Setting detail: THERAPIES SERIES
Discharge: HOME | End: 2024-07-23
Payer: COMMERCIAL

## 2024-07-23 PROCEDURE — 97110 THERAPEUTIC EXERCISES: CPT

## 2024-07-23 NOTE — PROGRESS NOTES
Physical Therapy: Daily Note   Patient: Eliseo Byrd (78 y.o. male)   Examination Date: 2024  Plan of Care/Certification Expiration Date: 10/08/24    No data recorded   :  1945 # of Visits since SOC:   5   MRN: [id number]  CSN: [id number] Start of Care Date:   7/10/2024   Insurance: Payor: [employer] / Plan: [employer] FEDERAL / Product Type: *No Product type* /   Insurance ID: [id number] - ([employer]) Secondary Insurance (if applicable):    Referring Physician: Adore Thomas MD Polly Lebuhn   PCP: Adore Thomas MD Visits to Date/Visits Approved:     No Show/Cancelled Appts:   /       Medical Diagnosis: Weakness [R53.1]  Other abnormalities of gait and mobility [R26.89]  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 Reported during session that he felt \"queasy.\"        OBJECTIVE EXAMINATION   Restrictions:  No data recorded No data recorded No data recorded        TREATMENT     Exercises:      Treatment Reasoning    Exercise 1: 6 MWT   233' * with 1 minute left to go, had to take several standing breaks to correct balance due to patient heavily leaning forward and losing balance at times, increased fatigue noted  VS  Pre:  B/P  139/81  O2 97%, HR 75 ;  Post: B/P 119/80,  O2 96%,  HR 80  --- OR  see below  Exercise 2: LBE, 5-10 min--not today  Exercise 3: Supine, TA series, 10 reps  Exercise 4: Supine, lower trunk rotation, 10 reps  Exercise 5: Supine, SLR, hip abd, 10 reps  Exercise 6: Supine, bridges, 10 reps  Exercise 7: Sitting, LAQ, 4#, 10 X 2 reps  Exercise 8: Sitting, t band leg curls, 10 reps  red  Exercise 9: Sitting, red t band hip abd; ball squeezes, 10 reps  Exercise 10: Sit to stand, mat to table, 10 reps; Seated trunk rotation, side bend, forw/bck bend, 5 reps each  Exercise 11: Standing, taps, 4\", 10 reps  not todya  Exercise 12: Standing, step

## 2024-07-25 ENCOUNTER — HOSPITAL ENCOUNTER (OUTPATIENT)
Dept: PHYSICAL THERAPY | Age: 79
Setting detail: THERAPIES SERIES
Discharge: HOME | End: 2024-07-25
Payer: COMMERCIAL

## 2024-07-25 ENCOUNTER — TELEPHONE (OUTPATIENT)
Age: 79
End: 2024-07-25
Payer: COMMERCIAL

## 2024-07-25 PROCEDURE — 97110 THERAPEUTIC EXERCISES: CPT

## 2024-07-25 NOTE — TELEPHONE ENCOUNTER
Hub to relay  Called patient regarding appt on 07/26/2024. Left message for patient to return call if any questions or concerns arise.

## 2024-07-25 NOTE — PROGRESS NOTES
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] Unsteady gait; LE weakness  Treatment Diagnosis: Unsteady gait; LE weakness      Insurance: Payor: [employer] / Plan: [employer] FEDERAL / Product Type: *No Product type* /   Insurance ID: [id number] - ([place] [employer])    Subjective:   General  Diagnosis: Unsteady gait; LE weakness  Referring Provider (secondary): Adore Thomas  PT Insurance Information: [employer]   ([facility])  Total # of Visits Approved: 12  Total # of Visits to Date: 6  Plan of Care/Certification Expiration Date: 10/08/24  Progress Note Due Date: 24  Referring Provider (secondary): Adore Thomas  Subjective: not having a real good day  Patient Currently in Pain: Yes  Pain Level: 1  Pain Type: Acute pain  Pain Location: Back  Pain Orientation: Lower       Treatment Activities:  Exercises:      Treatment Reasoning    Exercise 1: 6 MWT   331' with 1 seated rest  VS  Pre:  B/P  128/74  O2 96%, HR 70 ;  Post: B/P 129/74,  O2 97%,  HR 76  --- OR  see below  Exercise 2: LBE, 5-10 min--not today  Exercise 3: Supine, TA series, 10 reps  Exercise 4: Supine, lower trunk rotation, 10 reps  Exercise 5: Supine, SLR, hip abd, 10 reps  Exercise 6: Supine, bridges, 10 reps  Exercise 7: Sitting, LAQ, 4#, 10 X 2 reps  Exercise 8: Sitting, t band leg curls, 10 reps  red  Exercise 9: Sitting, red t band hip abd; ball squeezes, 10 reps  Exercise 10: Sit to stand, mat to table, 10 reps; Seated trunk rotation, side bend, forw/bck bend, 5 reps each  Exercise 11: Standing, taps, 4\", 10 reps  not today  Exercise 12: Standing, step ups, 4\", 10 reps      not totday  Exercise 13: Standing, hip abd, hip ext, mini squat, heel raise, 10 reps--not today  Exercise 14: Standing, //, narrow,

## 2024-07-26 ENCOUNTER — OFFICE VISIT (OUTPATIENT)
Age: 79
End: 2024-07-26
Payer: COMMERCIAL

## 2024-07-26 VITALS
WEIGHT: 235 LBS | HEIGHT: 73 IN | HEART RATE: 69 BPM | DIASTOLIC BLOOD PRESSURE: 76 MMHG | SYSTOLIC BLOOD PRESSURE: 118 MMHG | BODY MASS INDEX: 31.14 KG/M2 | OXYGEN SATURATION: 97 %

## 2024-07-26 DIAGNOSIS — I12.9 HYPERTENSIVE KIDNEY DISEASE, STAGE III: ICD-10-CM

## 2024-07-26 DIAGNOSIS — E11.22 TYPE 2 DIABETES MELLITUS WITH CHRONIC KIDNEY DISEASE, WITH LONG-TERM CURRENT USE OF INSULIN, UNSPECIFIED CKD STAGE: ICD-10-CM

## 2024-07-26 DIAGNOSIS — Z79.4 TYPE 2 DIABETES MELLITUS WITH CHRONIC KIDNEY DISEASE, WITH LONG-TERM CURRENT USE OF INSULIN, UNSPECIFIED CKD STAGE: ICD-10-CM

## 2024-07-26 DIAGNOSIS — L60.2 ONYCHOGRYPHOSIS: Primary | ICD-10-CM

## 2024-07-26 DIAGNOSIS — N18.30 HYPERTENSIVE KIDNEY DISEASE, STAGE III: ICD-10-CM

## 2024-07-26 DIAGNOSIS — E11.9 ENCOUNTER FOR DIABETIC FOOT EXAM: ICD-10-CM

## 2024-07-26 PROBLEM — E55.9 VITAMIN D DEFICIENCY: Status: ACTIVE | Noted: 2023-11-02

## 2024-07-26 PROBLEM — E03.9 HYPOTHYROIDISM: Status: ACTIVE | Noted: 2024-07-26

## 2024-07-29 ENCOUNTER — HOSPITAL ENCOUNTER (OUTPATIENT)
Dept: PHYSICAL THERAPY | Age: 79
Setting detail: THERAPIES SERIES
Discharge: HOME | End: 2024-07-29
Payer: COMMERCIAL

## 2024-07-29 PROCEDURE — 97110 THERAPEUTIC EXERCISES: CPT

## 2024-07-29 NOTE — PROGRESS NOTES
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] Unsteady gait; LE weakness  Treatment Diagnosis: Unsteady gait; LE weakness      Insurance: Payor: [employer] / Plan: [employer] FEDERAL / Product Type: *No Product type* /   Insurance ID: [id number] - ([place] [employer])    Subjective:   General  Diagnosis: Unsteady gait; LE weakness  Referring Provider (secondary): Adore Thomas  PT Insurance Information: [employer]   ([facility])  Total # of Visits Approved: 12  Total # of Visits to Date: 7  Plan of Care/Certification Expiration Date: 10/08/24  Progress Note Due Date: 24  Referring Provider (secondary): Adore Thomas  Subjective: Patient denies pain at the moment, appears more alert       Treatment Activities:  Exercises:      Treatment Reasoning    Exercise 1: 6 MWT   534' with 39 sec remaining  VS  Pre:  B/P  131/78  O2 97%, HR 73 ;  Post: B/P 160/77,  O2 96%,  HR 89  --- OR  see below  Exercise 2: LBE, 5-10 min--not today  Exercise 3: Supine, TA series, 10 reps  Exercise 4: Supine, lower trunk rotation, 10 reps  Exercise 5: Supine, SLR, hip abd, 10 reps  Exercise 6: Supine, bridges, 10 reps  Exercise 7: Sitting, LAQ, 4#, 10 X 2 reps  Exercise 8: Sitting, t band leg curls, 10 reps  red  Exercise 9: Sitting, red t band hip abd; ball squeezes, 10 reps  Exercise 10: Sit to stand, mat to table, 10 reps; Seated trunk rotation, side bend, forw/bck bend, 5 reps each  Exercise 11: Standing, taps, 4\", 10 reps  not today  Exercise 12: Standing, step ups, 4\", 10 reps  Exercise 13: Standing, hip abd, hip ext, mini squat, heel raise, 10 reps  Exercise 14: Standing, //, narrow, PRIYA, EO/EC, 30 sec--not today  Exercise 15: Standing, //, semi-tandem, EO/EC, 30 sec--not today  Exercise 16: Standing

## 2024-07-31 ENCOUNTER — HOSPITAL ENCOUNTER (OUTPATIENT)
Dept: PHYSICAL THERAPY | Age: 79
Setting detail: THERAPIES SERIES
Discharge: HOME | End: 2024-07-31
Payer: COMMERCIAL

## 2024-07-31 VITALS
HEART RATE: 68 BPM | SYSTOLIC BLOOD PRESSURE: 137 MMHG | RESPIRATION RATE: 30 BRPM | DIASTOLIC BLOOD PRESSURE: 76 MMHG | OXYGEN SATURATION: 94 %

## 2024-07-31 PROCEDURE — 97530 THERAPEUTIC ACTIVITIES: CPT

## 2024-07-31 PROCEDURE — 97110 THERAPEUTIC EXERCISES: CPT

## 2024-07-31 NOTE — PROGRESS NOTES
Physical Therapy: Daily Note   Patient: Eliseo Byrd (78 y.o. male)   Examination Date: 2024  Plan of Care/Certification Expiration Date: 10/08/24    No data recorded   :  1945 # of Visits since SOC:   8   MRN: [id number]  CSN: [id number] Start of Care Date:   7/10/2024   Insurance: Payor: [employer] / Plan: [employer] FEDERAL / Product Type: *No Product type* /   Insurance ID: [id number] - ([place] [employer]) Secondary Insurance (if applicable):    Referring Physician: Adore Thomas MD Polly Lebuhn   PCP: Adore Thoams MD Visits to Date/Visits Approved:     No Show/Cancelled Appts:   /       Medical Diagnosis: Weakness [R53.1]  Other abnormalities of gait and mobility [R26.89]  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  He has not fallen.    HEP Compliance: Good        OBJECTIVE EXAMINATION   Restrictions:  No data recorded No data recorded No data recorded              TREATMENT     Exercises:      Treatment Reasoning    Exercise 2: LBE, 5-10 min--not today  Exercise 3: Supine, TA series, 10 reps  Exercise 4: Supine, lower trunk rotation, 10 reps  Exercise 5: Supine, SLR, ; Hooklying abd/add, 10 reps  Exercise 6: Supine, bridges, 10 reps  Exercise 7: Sitting, LAQ, 5#, 10 X 2 reps  Exercise 8: Sitting, t band leg curls, 10 reps  red  Exercise 9: Sitting, red t band hip abd; ball squeezes, 10 reps  Exercise 10: Sit to stand, mat to table, 10 reps  (2024: 22.5\"); Seated trunk rotation, side bend, forw/bck bend, 5 reps each  Exercise 11: Standing, taps, 4\", 10 reps  not today  Exercise 12: Standing, step ups, 6\", 10 reps  Exercise 13: Standing, hip abd, hip ext, mini squat, heel raise, 10 reps  not today  Exercise 14: Standing, //, narrow, PRIYA, EO/EC, 30 sec--not today  Exercise 15: Standing, //,

## 2024-08-07 ENCOUNTER — HOSPITAL ENCOUNTER (OUTPATIENT)
Dept: PHYSICAL THERAPY | Age: 79
Setting detail: THERAPIES SERIES
End: 2024-08-07
Payer: COMMERCIAL

## 2024-08-09 ENCOUNTER — APPOINTMENT (OUTPATIENT)
Dept: PHYSICAL THERAPY | Age: 79
End: 2024-08-09
Payer: COMMERCIAL

## 2024-08-13 ENCOUNTER — HOSPITAL ENCOUNTER (OUTPATIENT)
Dept: PHYSICAL THERAPY | Age: 79
Setting detail: THERAPIES SERIES
Discharge: HOME | End: 2024-08-13
Payer: COMMERCIAL

## 2024-08-13 VITALS — DIASTOLIC BLOOD PRESSURE: 85 MMHG | HEART RATE: 75 BPM | SYSTOLIC BLOOD PRESSURE: 143 MMHG | OXYGEN SATURATION: 96 %

## 2024-08-13 PROCEDURE — 97530 THERAPEUTIC ACTIVITIES: CPT

## 2024-08-13 ASSESSMENT — 10 METER WALK TEST (10METWT)
TRIAL 1: TIME TO WALK 10 METERS: 7.29
AVERAGE VELOCITY: 7.3
AVERAGE VELOCITY - METERS PER SECOND: 0.82

## 2024-08-13 NOTE — PROGRESS NOTES
Physical Therapy: Daily Note/Reassessment   Patient: Eliseo Byrd (78 y.o. male)   Examination Date: 2024  Plan of Care/Certification Expiration Date: 10/08/24    No data recorded   :  1945 # of Visits since SOC:   9   MRN: [id number]  CSN: [id number] Start of Care Date:   7/10/2024   Insurance: Payor: [employer] / Plan: [email or web address] FEDERAL / Product Type: *No Product type* /   Insurance ID: [id number] - ([place] [employer]) Secondary Insurance (if applicable):    Referring Physician: Adore Thomas MD Polly Lebuhn   PCP: Adore Thomas MD Visits to Date/Visits Approved:     No Show/Cancelled Appts:   /       Medical Diagnosis: Weakness [R53.1]  Other abnormalities of gait and mobility [R26.89]  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  He denies recent fall.    HEP Compliance: Good        OBJECTIVE EXAMINATION   Restrictions:  No data recorded No data recorded No data recorded          Balance/Gait Assessment(s) Performed:   Brown Balance Scale   1. Sitting to Standing: Able to stand independently using hands  2. Standing Unsupported: Able to stand 2 minutes with supervision  3. Sitting with Back Unsupported but Feet Supported on Floor or on a Stool: Able to sit safely and securely for 2 minutes  4. Standing to Sitting: Controls descent by using hands  5.  Transfers: Able to transfer safely definite need of hands  6. Standing Unsupported with Eyes Closed: Able to stand 10 seconds with supervision  7. Standing Unsupported with Feet Together: Needs help to attain position and unable to hold for 15 seconds  8. Reach Forward with Outstretched Arm While Standing: Can reach forward 12 cm (5 inches)  9.  Object from Floor from a Standing Position: Able to  slipper but needs supervision  10. Turning to Look Behind Over Left and Right

## 2024-08-15 ENCOUNTER — HOSPITAL ENCOUNTER (OUTPATIENT)
Dept: PHYSICAL THERAPY | Age: 79
Setting detail: THERAPIES SERIES
Discharge: HOME | End: 2024-08-15
Payer: COMMERCIAL

## 2024-08-15 PROCEDURE — 97110 THERAPEUTIC EXERCISES: CPT

## 2024-08-15 ASSESSMENT — PAIN DESCRIPTION - PAIN TYPE: TYPE: ACUTE PAIN

## 2024-08-15 ASSESSMENT — PAIN DESCRIPTION - ORIENTATION: ORIENTATION: LOWER

## 2024-08-15 ASSESSMENT — PAIN DESCRIPTION - LOCATION: LOCATION: BACK

## 2024-08-15 ASSESSMENT — PAIN SCALES - GENERAL: PAINLEVEL_OUTOF10: 2

## 2024-08-15 NOTE — PROGRESS NOTES
1.2 m/sec.  STG 3 Current Status:: 8/13/2024    6MWT:  .82 m/sec  Short Term Goal 4: Patient will perform floor transfer in 2 min with min assist of 1.  STG 4 Current Status:: deferred  STG Goal 4 Status:: Deferred  Short Term Goal 5: Patient will perform HEP with min cuing.  STG 5 Current Status:: 8/13/2024  HEP issued    in progress  Long Term Goals  Time Frame for Long Term Goals : 6-8 weeks  Long Term Goal 1: Patient will increase LE strength in order to reduce fall risk as demonstrated by improvement of 5 Times Sit to Stand from unable to 5 reps in15 sec.  LTG 1 Current Status:: as  above  Long Term Goal 2: Patient will demonstrate improvement of functional balance with improvement of Brown Balance Scale from 37/56 to >50/56.  LTG 2 Current Status:: as above  Long Term Goal 3: Patient will improve functional ambulation and reduce fall risk as demonstrated by improvement of 6 Meter Walk Test from 1.05 m/sec to >1.2 m/sec.  LTG 3 Current Status:: as above  Long Term Goal 4: Patient will perform floor transfer in 2 min with min assist of 1.  LTG 4 Current Status:: deferred.  Long Term Goal 5: Patient will perform HEP with no cuing.  LTG 5 Current Status:: HEP issued.   in progress  LTG Goal 5 Status:: In progress  Patient Goals   Patient Goals : stronger    Plan:    Physical Therapy Plan  Plan weeks: 6-8 wks  Current Treatment Recommendations: Strengthening, Balance training, Functional mobility training, Transfer training, ADL/Self-care training, Stair training, Wheelchair mobility training, Endurance training, Neuromuscular re-education, Home exercise program, Therapeutic activities      Therapy Time:   Individual Concurrent Group Co-treatment   Time In 1600         Time Out 1651         Minutes 51            Pravin Estrada PTA   Electronically signed by Pravin Estrada PTA on 8/15/2024 at 5:09 PM

## 2024-10-07 DIAGNOSIS — E03.8 OTHER SPECIFIED HYPOTHYROIDISM: ICD-10-CM

## 2024-10-07 DIAGNOSIS — E29.1 HYPOGONADISM IN MALE: ICD-10-CM

## 2024-10-07 DIAGNOSIS — E55.9 VITAMIN D DEFICIENCY: ICD-10-CM

## 2024-10-07 DIAGNOSIS — E11.21 TYPE 2 DIABETES MELLITUS WITH DIABETIC NEPHROPATHY, WITH LONG-TERM CURRENT USE OF INSULIN (HCC): ICD-10-CM

## 2024-10-07 DIAGNOSIS — Z79.4 TYPE 2 DIABETES MELLITUS WITH DIABETIC NEPHROPATHY, WITH LONG-TERM CURRENT USE OF INSULIN (HCC): ICD-10-CM

## 2024-10-07 LAB
25(OH)D3 SERPL-MCNC: 89.1 NG/ML
ALBUMIN SERPL-MCNC: 3.7 G/DL (ref 3.5–5.2)
ALP SERPL-CCNC: 85 U/L (ref 40–129)
ALT SERPL-CCNC: 9 U/L (ref 5–41)
ANION GAP SERPL CALCULATED.3IONS-SCNC: 11 MMOL/L (ref 7–19)
AST SERPL-CCNC: 11 U/L (ref 5–40)
BILIRUB SERPL-MCNC: 0.5 MG/DL (ref 0.2–1.2)
BUN SERPL-MCNC: 27 MG/DL (ref 8–23)
CALCIUM SERPL-MCNC: 9.1 MG/DL (ref 8.8–10.2)
CHLORIDE SERPL-SCNC: 105 MMOL/L (ref 98–111)
CHOLEST SERPL-MCNC: 108 MG/DL (ref 0–199)
CO2 SERPL-SCNC: 25 MMOL/L (ref 22–29)
CREAT SERPL-MCNC: 1.9 MG/DL (ref 0.7–1.2)
ERYTHROCYTE [DISTWIDTH] IN BLOOD BY AUTOMATED COUNT: 11.7 % (ref 11.5–14.5)
GLUCOSE SERPL-MCNC: 112 MG/DL (ref 70–99)
HBA1C MFR BLD: 7 % (ref 4–5.6)
HCT VFR BLD AUTO: 51.9 % (ref 42–52)
HDLC SERPL-MCNC: 49 MG/DL (ref 40–60)
HGB BLD-MCNC: 16.5 G/DL (ref 14–18)
LDLC SERPL CALC-MCNC: 44 MG/DL
MCH RBC QN AUTO: 32.5 PG (ref 27–31)
MCHC RBC AUTO-ENTMCNC: 31.8 G/DL (ref 33–37)
MCV RBC AUTO: 102.2 FL (ref 80–94)
PLATELET # BLD AUTO: 142 K/UL (ref 130–400)
PMV BLD AUTO: 10.6 FL (ref 9.4–12.4)
POTASSIUM SERPL-SCNC: 4.1 MMOL/L (ref 3.5–5)
PROT SERPL-MCNC: 6.4 G/DL (ref 6.4–8.3)
RBC # BLD AUTO: 5.08 M/UL (ref 4.7–6.1)
SODIUM SERPL-SCNC: 141 MMOL/L (ref 136–145)
TESTOST SERPL-MCNC: 625.6 NG/DL (ref 193–740)
TRIGL SERPL-MCNC: 73 MG/DL (ref 0–149)
TSH SERPL DL<=0.005 MIU/L-ACNC: 4.38 UIU/ML (ref 0.27–4.2)
WBC # BLD AUTO: 5.8 K/UL (ref 4.8–10.8)

## 2024-10-09 ENCOUNTER — TELEPHONE (OUTPATIENT)
Dept: HEMATOLOGY | Age: 79
End: 2024-10-09

## 2024-10-09 ENCOUNTER — OFFICE VISIT (OUTPATIENT)
Dept: INTERNAL MEDICINE | Age: 79
End: 2024-10-09
Payer: COMMERCIAL

## 2024-10-09 VITALS
HEART RATE: 79 BPM | DIASTOLIC BLOOD PRESSURE: 72 MMHG | OXYGEN SATURATION: 96 % | HEIGHT: 73 IN | WEIGHT: 238.4 LBS | BODY MASS INDEX: 31.6 KG/M2 | SYSTOLIC BLOOD PRESSURE: 112 MMHG

## 2024-10-09 DIAGNOSIS — Z79.4 TYPE 2 DIABETES MELLITUS WITH DIABETIC NEPHROPATHY, WITH LONG-TERM CURRENT USE OF INSULIN (HCC): ICD-10-CM

## 2024-10-09 DIAGNOSIS — G21.4 VASCULAR PARKINSONISM (HCC): Primary | ICD-10-CM

## 2024-10-09 DIAGNOSIS — E55.9 VITAMIN D DEFICIENCY: ICD-10-CM

## 2024-10-09 DIAGNOSIS — I10 PRIMARY HYPERTENSION: ICD-10-CM

## 2024-10-09 DIAGNOSIS — E11.21 TYPE 2 DIABETES MELLITUS WITH DIABETIC NEPHROPATHY, WITH LONG-TERM CURRENT USE OF INSULIN (HCC): ICD-10-CM

## 2024-10-09 DIAGNOSIS — Z23 NEED FOR VACCINATION: ICD-10-CM

## 2024-10-09 DIAGNOSIS — F01.50 VASCULAR DEMENTIA WITHOUT BEHAVIORAL DISTURBANCE, PSYCHOTIC DISTURBANCE, MOOD DISTURBANCE, OR ANXIETY, UNSPECIFIED DEMENTIA SEVERITY (HCC): ICD-10-CM

## 2024-10-09 DIAGNOSIS — E29.1 HYPOGONADISM IN MALE: ICD-10-CM

## 2024-10-09 PROCEDURE — 3051F HG A1C>EQUAL 7.0%<8.0%: CPT | Performed by: INTERNAL MEDICINE

## 2024-10-09 PROCEDURE — 1123F ACP DISCUSS/DSCN MKR DOCD: CPT | Performed by: INTERNAL MEDICINE

## 2024-10-09 PROCEDURE — 3074F SYST BP LT 130 MM HG: CPT | Performed by: INTERNAL MEDICINE

## 2024-10-09 PROCEDURE — 3078F DIAST BP <80 MM HG: CPT | Performed by: INTERNAL MEDICINE

## 2024-10-09 PROCEDURE — 90471 IMMUNIZATION ADMIN: CPT | Performed by: INTERNAL MEDICINE

## 2024-10-09 PROCEDURE — 99214 OFFICE O/P EST MOD 30 MIN: CPT | Performed by: INTERNAL MEDICINE

## 2024-10-09 PROCEDURE — 90653 IIV ADJUVANT VACCINE IM: CPT | Performed by: INTERNAL MEDICINE

## 2024-10-09 NOTE — TELEPHONE ENCOUNTER
I called and left patient a detailed voicemail with their appointment date and time for 10/09/24 and to come at the follow up appointment time and not the lab appointment time. I also made them aware of what that time was.  I made patient aware that we are in the [facility] and where it is located and gave the address in case, they use GPS. Left message for patient to call our office if they could not keep this appointment or if they did not know where our new building is located.

## 2024-10-09 NOTE — PROGRESS NOTES
Chief Complaint   Patient presents with    3 Month Follow-Up       HPI: Patient is here today to follow-up medical problems vascular dementia vascular Parkinson's slow speech delayed responses generally continues to be weak seems a little weaker each time I see him.     Past Medical History:   Diagnosis Date    BiPAP (biphasic positive airway pressure) dependence     Cerebrovascular disease 2017    Chronic kidney disease 2004    Depression     Diabetes (HCC)     Hearing loss     Hyperlipidemia     Hypersomnia     Hypertension     Mild cognitive impairment     MARYANA (obstructive sleep apnea)     Osteoarthritis     Stage 3 chronic kidney disease (HCC)     Stroke ([facility])     Type 2 diabetes mellitus without complication ([facility]) 1998    Vascular disease        Past Surgical History:   Procedure Laterality Date    EYE SURGERY  2018    Cataracts temoval       Family History   Problem Relation Age of Onset    High Blood Pressure Mother     High Cholesterol Mother     No Known Problems Father     Obesity Brother     No Known Problems Maternal Grandmother     No Known Problems Maternal Grandfather     No Known Problems Paternal Grandmother     No Known Problems Paternal Grandfather        Social History     Socioeconomic History    Marital status:      Spouse name: Not on file    Number of children: Not on file    Years of education: Not on file    Highest education level: Not on file   Occupational History    Not on file   Tobacco Use    Smoking status: Never     Passive exposure: Past    Smokeless tobacco: Never   Vaping Use    Vaping status: Never Used   Substance and Sexual Activity    Alcohol use: Not Currently    Drug use: Never    Sexual activity: Not Currently     Partners: Female   Other Topics Concern    Not on file   Social History Narrative    Not on file     Social Determinants of Health     Financial Resource Strain: Low Risk  (12/26/2023)    Overall Financial Resource Strain (CARDIA)     Difficulty of Paying

## 2024-10-10 ENCOUNTER — HOSPITAL ENCOUNTER (OUTPATIENT)
Dept: INFUSION THERAPY | Age: 79
Discharge: HOME | End: 2024-10-10
Payer: MEDICARE

## 2024-10-10 ENCOUNTER — OFFICE VISIT (OUTPATIENT)
Dept: HEMATOLOGY | Age: 79
End: 2024-10-10
Payer: MEDICARE

## 2024-10-10 VITALS
HEIGHT: 73 IN | BODY MASS INDEX: 31.68 KG/M2 | SYSTOLIC BLOOD PRESSURE: 136 MMHG | OXYGEN SATURATION: 98 % | HEART RATE: 65 BPM | WEIGHT: 239 LBS | TEMPERATURE: 97.9 F | DIASTOLIC BLOOD PRESSURE: 72 MMHG

## 2024-10-10 DIAGNOSIS — D47.2 GAMMOPATHY: ICD-10-CM

## 2024-10-10 DIAGNOSIS — N18.32 CHRONIC RENAL FAILURE, STAGE 3B (HCC): ICD-10-CM

## 2024-10-10 DIAGNOSIS — I82.531 CHRONIC DEEP VEIN THROMBOSIS (DVT) OF POPLITEAL VEIN OF RIGHT LOWER EXTREMITY (HCC): ICD-10-CM

## 2024-10-10 DIAGNOSIS — I82.512 CHRONIC DEEP VEIN THROMBOSIS (DVT) OF FEMORAL VEIN OF LEFT LOWER EXTREMITY (HCC): ICD-10-CM

## 2024-10-10 DIAGNOSIS — Z86.718 PERSONAL HISTORY OF DVT (DEEP VEIN THROMBOSIS): Primary | ICD-10-CM

## 2024-10-10 DIAGNOSIS — Z79.01 CURRENT USE OF LONG TERM ANTICOAGULATION: ICD-10-CM

## 2024-10-10 LAB
BASOPHILS # BLD: 0.03 K/UL (ref 0.01–0.08)
BASOPHILS NFR BLD: 0.6 % (ref 0.1–1.2)
EOSINOPHIL # BLD: 0.1 K/UL (ref 0.04–0.54)
EOSINOPHIL NFR BLD: 2 % (ref 0.7–7)
ERYTHROCYTE [DISTWIDTH] IN BLOOD BY AUTOMATED COUNT: 11.8 % (ref 11.6–14.4)
HCT VFR BLD AUTO: 52.4 % (ref 40.1–51)
HGB BLD-MCNC: 17.3 G/DL (ref 13.7–17.5)
LYMPHOCYTES # BLD: 1.36 K/UL (ref 1.18–3.74)
LYMPHOCYTES NFR BLD: 27.5 % (ref 19.3–53.1)
MCH RBC QN AUTO: 33.1 PG (ref 25.7–32.2)
MCHC RBC AUTO-ENTMCNC: 33 G/DL (ref 32.3–36.5)
MCV RBC AUTO: 100.2 FL (ref 79–92.2)
MONOCYTES # BLD: 0.45 K/UL (ref 0.24–0.82)
MONOCYTES NFR BLD: 9.1 % (ref 4.7–12.5)
NEUTROPHILS # BLD: 2.99 K/UL (ref 1.56–6.13)
NEUTS SEG NFR BLD: 60.6 % (ref 34–71.1)
PLATELET # BLD AUTO: 132 K/UL (ref 163–337)
PMV BLD AUTO: 10.8 FL (ref 7.4–10.4)
RBC # BLD AUTO: 5.23 M/UL (ref 4.63–6.08)
WBC # BLD AUTO: 4.94 K/UL (ref 4.23–9.07)

## 2024-10-10 PROCEDURE — 85025 COMPLETE CBC W/AUTO DIFF WBC: CPT

## 2024-10-10 PROCEDURE — 99212 OFFICE O/P EST SF 10 MIN: CPT

## 2024-10-10 PROCEDURE — 36415 COLL VENOUS BLD VENIPUNCTURE: CPT

## 2024-10-10 RX ORDER — TESTOSTERONE 25 MG/2.5G
2.5 GEL TRANSDERMAL DAILY
COMMUNITY

## 2024-10-10 RX ORDER — LOSARTAN POTASSIUM 50 MG/1
50 TABLET ORAL DAILY
COMMUNITY

## 2024-10-10 ASSESSMENT — ENCOUNTER SYMPTOMS
EYE ITCHING: 0
TROUBLE SWALLOWING: 0
BLOOD IN STOOL: 0
SHORTNESS OF BREATH: 0
SORE THROAT: 0
PHOTOPHOBIA: 0
VOMITING: 0
DIARRHEA: 0
BACK PAIN: 0
CONSTIPATION: 0
COUGH: 0
COLOR CHANGE: 0
NAUSEA: 0
EYE DISCHARGE: 0
VOICE CHANGE: 0
ABDOMINAL PAIN: 0
ABDOMINAL DISTENTION: 0
WHEEZING: 0

## 2024-10-10 NOTE — PROGRESS NOTES
Progress Note      Pt Name: Eliseo Byrd  YOB: 1945  MRN: [id number]    Date of evaluation: 10/10/2024  History Obtained From:  patient, [place], electronic medical record    CHIEF COMPLAINT:    Chief Complaint   Patient presents with    Follow-up     Personal history of DVT (deep vein thrombosis)     Current active problems  Chronic DVT BLE  Gammopathy    HISTORY OF PRESENT ILLNESS:    Eliseo Byrd is a 78 y.o.  male followed in the office since 8/29/2023 because of history of bilateral lower extremity DVT.  These occurred back in 2013.  He has stage IIIb chronic renal failure with GFR running in the upper 30 range.  He currently continues taking Xarelto 20 mg daily.  He is on folic acid 1 mg daily for elevated serum homocystine level.  He has not had any significant issue with edema of the legs.  He has mild varicose veins.  He has not had any issue with wounds, ulcers of his legs.    He continues on Sinemet 25/100 3 times daily for his Parkinson's.  He is on atorvastatin 40 mg daily for cholesterol.  He is diabetic, on insulin.  Hypothyroidism currently on levothyroxine 137 mcg daily.  Blood pressure controlled with losartan 50 mg daily.  He takes vitamin D 50,000 units weekly.  He is on Lexapro 20 mg daily.  Is diabetic.  A1c 7.0.      HEMATOLOGY HISTORY: Bilateral lower extremity DVT  Eliseo was seen in initial hematology consultation on 8/29/2023 referred from Dr. Jan Maier for evaluation of  Coagulation Defect, Unspecified Hx of Blood Clots.     Eliseo has history of DVT of the bilateral lower extremities dating back to at least 2013.  He was treated previously on warfarin and is currently on Xarelto 20 mg daily.  Has been diagnosed with Parkinson's disease-ischemic component and is under continuation at [facility].  They are potentially going to try antiplatelet therapy.  Unfortunately one of his issues he has poor balance related to his Parkinson's and he would be at risk of bleeding

## 2024-10-11 ENCOUNTER — TELEPHONE (OUTPATIENT)
Dept: INTERNAL MEDICINE | Age: 79
End: 2024-10-11

## 2024-10-11 DIAGNOSIS — E29.1 HYPOGONADISM IN MALE: Primary | ICD-10-CM

## 2024-10-11 RX ORDER — TESTOSTERONE GEL, 1% 10 MG/G
GEL TRANSDERMAL
Qty: 90 EACH | Refills: 3 | Status: SHIPPED | OUTPATIENT
Start: 2024-10-11 | End: 2025-01-11

## 2024-10-11 NOTE — TELEPHONE ENCOUNTER
I sent it to [facility]--( if there is issue she might take packet to [facility] and have them fax us request for med-- there is always trouble putting in testosterone gel in [facility] - he got this in [place] before )

## 2024-10-13 LAB — HCYS SERPL-SCNC: 18 UMOL/L (ref 0–15)

## 2024-10-14 ENCOUNTER — TELEPHONE (OUTPATIENT)
Dept: HEMATOLOGY | Age: 79
End: 2024-10-14

## 2024-10-14 LAB
ALBUMIN SERPL-MCNC: 3.7 G/DL (ref 3.75–5.01)
ALPHA1 GLOB SERPL ELPH-MCNC: 0.27 G/DL (ref 0.19–0.46)
ALPHA2 GLOB SERPL ELPH-MCNC: 0.63 G/DL (ref 0.48–1.05)
B-GLOBULIN SERPL ELPH-MCNC: 0.81 G/DL (ref 0.48–1.1)
DEPRECATED KAPPA LC FREE/LAMBDA SER: 1.84 {RATIO} (ref 0.26–1.65)
EER MONOCLONAL PROTEIN AND FLC, SERUM: ABNORMAL
GAMMA GLOB SERPL ELPH-MCNC: 0.89 G/DL (ref 0.62–1.51)
IGA SERPL-MCNC: 285 MG/DL (ref 68–408)
IGG SERPL-MCNC: 950 MG/DL (ref 768–1632)
IGM SERPL-MCNC: 97 MG/DL (ref 35–263)
INTERPRETATION SERPL IFE-IMP: ABNORMAL
INTERPRETATION SERPL IFE-IMP: ABNORMAL
KAPPA LC FREE SER-MCNC: 44.27 MG/L (ref 3.3–19.4)
LAMBDA LC FREE SERPL-MCNC: 24.05 MG/L (ref 5.71–26.3)
MONOCLONAL PROTEIN, SERUM: ABNORMAL G/DL
PROT SERPL-MCNC: 6.3 G/DL (ref 6.3–8.2)

## 2024-10-14 NOTE — TELEPHONE ENCOUNTER
----- Message from Benjamin Zacarias PA-C sent at 10/14/2024  7:43 AM CDT -----  Increase folic acid to 2 mg daily

## 2024-10-17 ENCOUNTER — TELEPHONE (OUTPATIENT)
Dept: HEMATOLOGY | Age: 79
End: 2024-10-17

## 2024-10-18 ENCOUNTER — TELEPHONE (OUTPATIENT)
Dept: HEMATOLOGY | Age: 79
End: 2024-10-18

## 2024-10-22 PROBLEM — Z86.73 HISTORY OF STROKE IN ADULTHOOD: Status: ACTIVE | Noted: 2017-02-20

## 2024-10-24 ENCOUNTER — TELEPHONE (OUTPATIENT)
Dept: NEUROLOGY | Age: 79
End: 2024-10-24

## 2024-10-24 NOTE — PROGRESS NOTES
[facility] - PODIATRY    Today's Date: 10/25/24    Patient Name: Haseeb Joshua  MRN: [id number]  CSN: [id number]  PCP: Daniel Bennett DO  Referring Provider: No ref. provider found    SUBJECTIVE     Chief Complaint   Patient presents with    Follow-up     Daniel Bennett DO 3 [facility] DIABETIC     HPI: Haseeb Joshua, a 78 y.o.male, comes to clinic as a(n) established patient presenting for diabetic foot exam and complaining of painful toenails. Patient has h/o CKD, diabetes, DVT, hypertension, hypothyroidism, CVA . Patient is IDDM and unsure of last BG level.  Unsure of last A1c. Today reports his toenails are thickened, irregular and in need of care today. DOes have difficulty providing care for himself. Denies significant numbness or tingling in feet.  Denies open wounds or sores today. Not using any assistive device for ambulation today however seems to have shuffling gait at times. Denies pain. Relates previous treatment(s) including care by podiatry . Denies any constitutional symptoms. No other pedal complaints at this time.    Past Medical History:   Diagnosis Date    Chronic kidney disease     stage 3    Diabetes mellitus     DVT (deep venous thrombosis)     Hypertension     Hypothyroidism     Parkinsonism     Stroke 09/09/2016    PCA distribution stroke, left     History reviewed. No pertinent surgical history.  Family History   Problem Relation Age of Onset    Diabetes Mother     Diabetes Brother      Social History     Socioeconomic History    Marital status:    Tobacco Use    Smoking status: Never     Passive exposure: Never    Smokeless tobacco: Never   Vaping Use    Vaping status: Never Used   Substance and Sexual Activity    Alcohol use: No    Drug use: Never    Sexual activity: Not Currently     Partners: Female     Allergies   Allergen Reactions    Latex Itching     possible    Lisinopril Cough     Current Outpatient Medications   Medication Sig Dispense  Refill    ACCU-CHEK ANITRA PLUS test strip       Arginine 600 MG capsule Take 600 mg by mouth Daily.      atorvastatin (LIPITOR) 10 MG tablet Take 1 tablet by mouth Daily.      calcitriol (ROCALTROL) 0.25 MCG capsule Take 1 capsule by mouth Daily.      levothyroxine (SYNTHROID, LEVOTHROID) 100 MCG tablet Take 1 tablet by mouth Daily.      lisinopril (PRINIVIL,ZESTRIL) 20 MG tablet Take 1 tablet by mouth Daily.      losartan (COZAAR) 100 MG tablet       lovastatin (MEVACOR) 20 MG tablet       mirtazapine (REMERON) 15 MG tablet       NovoLOG FlexPen 100 UNIT/ML solution pen-injector sc pen       rivaroxaban (XARELTO) 20 MG tablet Take 1 tablet by mouth Daily.      insulin glargine (LANTUS, SEMGLEE) 100 UNIT/ML injection Inject  under the skin into the appropriate area as directed Daily.       No current facility-administered medications for this visit.     Review of Systems   Constitutional:  Negative for chills and fever.   HENT:  Negative for congestion.    Respiratory:  Negative for shortness of breath.    Cardiovascular:  Negative for chest pain and leg swelling.   Gastrointestinal:  Negative for constipation, diarrhea, nausea and vomiting.   Musculoskeletal:  Negative for arthralgias, gait problem and myalgias.   Skin:  Negative for wound.        Thickened nails   Neurological:  Negative for numbness.       OBJECTIVE     Vitals:    10/25/24 1457   BP: 152/80   Pulse: 81   SpO2: 96%           PHYSICAL EXAM  GEN:   Accompanied by none.     Foot/Ankle Exam    GENERAL  Diabetic foot exam performed    Appearance:  appears stated age  Orientation:  AAOx3  Affect:  appropriate  Gait:  shuffling  Assistance:  independent  Right shoe gear: casual shoe  Left shoe gear: casual shoe    VASCULAR     Right Foot Vascularity   Dorsalis pedis:  2+  Posterior tibial:  2+  Skin temperature:  warm  Edema grading:  None  CFT:  3  Pedal hair growth:  Present  Varicosities:  none     Left Foot Vascularity   Dorsalis pedis:   2+  Posterior tibial:  2+  Skin temperature:  warm  Edema grading:  None  CFT:  3  Pedal hair growth:  Present  Varicosities:  none     NEUROLOGIC     Right Foot Neurologic   Normal sensation    Light touch sensation: normal  Vibratory sensation: normal  Hot/Cold sensation: normal  Protective Sensation using [place] Monofilament:   Sites intact: 10  Sites tested: 10     Left Foot Neurologic   Normal sensation    Light touch sensation: normal  Vibratory sensation: normal  Hot/Cold sensation:  normal  Protective Sensation using [place] Monofilament:   Sites intact: 10  Sites tested: 10    MUSCULOSKELETAL     Right Foot Musculoskeletal   Ecchymosis:  none  Tenderness:  none    Arch:  Normal     Left Foot Musculoskeletal   Ecchymosis:  none  Tenderness:  none  Arch:  Normal    MUSCLE STRENGTH     Right Foot Muscle Strength   Foot dorsiflexion:  5  Foot plantar flexion:  5  Foot inversion:  5  Foot eversion:  5     Left Foot Muscle Strength   Foot dorsiflexion:  5  Foot plantar flexion:  5  Foot inversion:  5  Foot eversion:  5    RANGE OF MOTION     Right Foot Range of Motion   Foot and ankle ROM within normal limits       Left Foot Range of Motion   Foot and ankle ROM within normal limits      DERMATOLOGIC      Right Foot Dermatologic   Skin  Positive for tinea.   Nails  1.  Positive for elongated and abnormal thickness.  2.  Positive for elongated and abnormal thickness.  3.  Positive for elongated and abnormal thickness.  4.  Positive for elongated and abnormal thickness.  5.  Positive for elongated and abnormal thickness.     Left Foot Dermatologic   Skin  Positive for tinea.   Nails comment:  Onychogryphosis of hallux  Nails  1.  Positive for onychomycosis, abnormal thickness, subungual debris and dystrophic nail.  2.  Positive for elongated and abnormal thickness.  3.  Positive for elongated, onychomycosis, abnormal thickness and dystrophic nail.  4.  Positive for elongated and abnormally  "thick.  5.  Positive for elongated.     Right foot additional comments: Interdigital tinea pedis noted.      Left foot additional comments: Interdigital tinea pedis noted.       RADIOLOGY/NUCLEAR:  No results found.    LABORATORY/CULTURE RESULTS:      PATHOLOGY RESULTS:       ASSESSMENT/PLAN     Diagnoses and all orders for this visit:    1. Onychomycosis (Primary)    2. Type 2 diabetes mellitus with chronic kidney disease, with long-term current use of insulin, unspecified CKD stage    3. Hypertensive kidney disease, stage III    4. Tinea pedis of both feet    5. Shuffling gait        Comprehensive lower extremity examination and evaluation was performed.  Discussed findings and treatment plan including risks, benefits, and treatment options with patient in detail. Patient agreed with treatment plan.  After verbal consent obtained, nail(s) x10 debrided of length and thickness with nail nipper without incidence  Patient may maintain nails and calluses at home utilizing emery board or pumice stone between visits as needed  Reviewed at home diabetic foot care including daily foot checks   Continue control management of type II DM per PCP.  Findings consistent with interdigital tinea pedis. Offered to send in prescription antifungal or patient could use topical. States he \"doesn't believe it is an issue right now.\" Instructed to keep feet dry, dry thoroughly between toes, wear moisture wicking socks and to allow shoes to dry between wear if become wet or damp.  Patient scheduled to return in 3 months.  Encouraged patient to call sooner with questions or concerns.    An After Visit Summary was printed and given to the patient at discharge, including (if requested) any available informative/educational handouts regarding diagnosis, treatment, or medications. All questions were answered to patient/family satisfaction. Should symptoms fail to improve or worsen they agree to call or return to clinic or to go to the " Emergency Department. Discussed the importance of following up with any needed screening tests/labs/specialist appointments and any requested follow-up recommended by me today. Importance of maintaining follow-up discussed and patient accepts that missed appointments can delay diagnosis and potentially lead to worsening of conditions.  Return in about 3 months (around 1/25/2025) for Follow-up with APRN, Follow-up in [facility] or sooner if acute issues arise.        This document has been electronically signed by SANDRA Flores on October 25, 2024 15:22 CDT

## 2024-10-25 ENCOUNTER — OFFICE VISIT (OUTPATIENT)
Age: 79
End: 2024-10-25
Payer: COMMERCIAL

## 2024-10-25 ENCOUNTER — TELEPHONE (OUTPATIENT)
Dept: NEUROLOGY | Age: 79
End: 2024-10-25

## 2024-10-25 VITALS
HEART RATE: 81 BPM | DIASTOLIC BLOOD PRESSURE: 80 MMHG | SYSTOLIC BLOOD PRESSURE: 152 MMHG | HEIGHT: 73 IN | BODY MASS INDEX: 30.62 KG/M2 | OXYGEN SATURATION: 96 % | WEIGHT: 231 LBS

## 2024-10-25 DIAGNOSIS — E11.22 TYPE 2 DIABETES MELLITUS WITH CHRONIC KIDNEY DISEASE, WITH LONG-TERM CURRENT USE OF INSULIN, UNSPECIFIED CKD STAGE: ICD-10-CM

## 2024-10-25 DIAGNOSIS — B35.1 ONYCHOMYCOSIS: Primary | ICD-10-CM

## 2024-10-25 DIAGNOSIS — N18.30 HYPERTENSIVE KIDNEY DISEASE, STAGE III: ICD-10-CM

## 2024-10-25 DIAGNOSIS — Z79.4 TYPE 2 DIABETES MELLITUS WITH CHRONIC KIDNEY DISEASE, WITH LONG-TERM CURRENT USE OF INSULIN, UNSPECIFIED CKD STAGE: ICD-10-CM

## 2024-10-25 DIAGNOSIS — I12.9 HYPERTENSIVE KIDNEY DISEASE, STAGE III: ICD-10-CM

## 2024-10-25 DIAGNOSIS — B35.3 TINEA PEDIS OF BOTH FEET: ICD-10-CM

## 2024-10-25 DIAGNOSIS — R26.89 SHUFFLING GAIT: ICD-10-CM

## 2024-10-25 NOTE — TELEPHONE ENCOUNTER
Carolina returned call to give names of providers seen in [place]. Dr. Sandhya Horowitz-Neurology- cognitive, Dr. Seble Grey- Neurology-Movement.

## 2024-10-25 NOTE — TELEPHONE ENCOUNTER
Patient wife will be calling the office back to let us know the name of the provider patient had seen at [facility] to where we can send to get medical records.

## 2024-11-18 DIAGNOSIS — E29.1 HYPOGONADISM IN MALE: ICD-10-CM

## 2024-11-18 DIAGNOSIS — I1A.0 RESISTANT HYPERTENSION: Primary | ICD-10-CM

## 2024-11-18 DIAGNOSIS — F32.A DEPRESSIVE DISORDER: ICD-10-CM

## 2024-11-18 RX ORDER — LOSARTAN POTASSIUM 50 MG/1
50 TABLET ORAL DAILY
Qty: 90 TABLET | Refills: 3 | Status: SHIPPED | OUTPATIENT
Start: 2024-11-18

## 2024-11-18 RX ORDER — ESCITALOPRAM OXALATE 20 MG/1
20 TABLET ORAL DAILY
Qty: 90 TABLET | Refills: 3 | Status: SHIPPED | OUTPATIENT
Start: 2024-11-18

## 2024-11-20 RX ORDER — TESTOSTERONE GEL, 1% 10 MG/G
GEL TRANSDERMAL
Qty: 90 EACH | Refills: 0 | Status: SHIPPED | OUTPATIENT
Start: 2024-11-20 | End: 2025-02-18

## 2024-11-22 ENCOUNTER — TELEPHONE (OUTPATIENT)
Dept: INTERNAL MEDICINE | Age: 79
End: 2024-11-22

## 2024-11-22 NOTE — TELEPHONE ENCOUNTER
[facility] needs to verify quantity of Testosterone to prescribe. I can call back and give the ok for either one 75G bottle or two bottles which would cost the same for the pt as one bottle. Please advise.    Call back [phone]  Ref # [id number]

## 2024-11-22 NOTE — TELEPHONE ENCOUNTER
I called [facility] back and they told me that they were only able to fill one bottle since it is a controlled substance. They will ship this medication out to the patient.

## 2024-12-05 ENCOUNTER — OFFICE VISIT (OUTPATIENT)
Dept: NEUROLOGY | Age: 79
End: 2024-12-05
Payer: COMMERCIAL

## 2024-12-05 VITALS
BODY MASS INDEX: 31.54 KG/M2 | RESPIRATION RATE: 16 BRPM | DIASTOLIC BLOOD PRESSURE: 81 MMHG | SYSTOLIC BLOOD PRESSURE: 130 MMHG | HEART RATE: 86 BPM | WEIGHT: 238 LBS | HEIGHT: 73 IN

## 2024-12-05 DIAGNOSIS — G21.4 VASCULAR PARKINSONISM (HCC): ICD-10-CM

## 2024-12-05 DIAGNOSIS — G47.33 OBSTRUCTIVE SLEEP APNEA: ICD-10-CM

## 2024-12-05 DIAGNOSIS — F01.C0 SEVERE VASCULAR DEMENTIA WITHOUT BEHAVIORAL DISTURBANCE, PSYCHOTIC DISTURBANCE, MOOD DISTURBANCE, OR ANXIETY (HCC): Primary | ICD-10-CM

## 2024-12-05 DIAGNOSIS — G47.52 REM SLEEP BEHAVIOR DISORDER: ICD-10-CM

## 2024-12-05 PROCEDURE — 99204 OFFICE O/P NEW MOD 45 MIN: CPT | Performed by: PSYCHIATRY & NEUROLOGY

## 2024-12-05 PROCEDURE — 3079F DIAST BP 80-89 MM HG: CPT | Performed by: PSYCHIATRY & NEUROLOGY

## 2024-12-05 PROCEDURE — 1159F MED LIST DOCD IN RCRD: CPT | Performed by: PSYCHIATRY & NEUROLOGY

## 2024-12-05 PROCEDURE — 3075F SYST BP GE 130 - 139MM HG: CPT | Performed by: PSYCHIATRY & NEUROLOGY

## 2024-12-05 PROCEDURE — 1123F ACP DISCUSS/DSCN MKR DOCD: CPT | Performed by: PSYCHIATRY & NEUROLOGY

## 2024-12-05 NOTE — PATIENT INSTRUCTIONS
INSTRUCTIONS:  1. Increase the Aricept (donepezil) to 10 mg at night  2. Take melatonin 3 mg at night  3. Continue the other medications

## 2024-12-05 NOTE — PROGRESS NOTES
for an acute infarct, and therefore,  MRI is recommended if clinical suspicion is high. Unchanged multifocal areas of  encephalomalacia, most marked within the left parieto-occipitotemporal lobes.    2. Patent major intracranial arteries. No aneurysm.    3. Unchanged nonvisualization of the V1 and proximal V2 segments of the left  vertebral artery, unclear if due to chronic occlusion and/or diminutive caliber;  contrast flow is present distally. Otherwise, patent major neck arteries. No  significant stenosis of the bilateral internal carotid arteries.  Narrative    EXAM:  CT HEAD NECK ANGIOGRAM AND CEREBRAL PERFUSION WITH IV CONTRAST    COMPARISON:  CT head dated 3/4/2020, CTA head and neck dated 6/9/2019.    CT HEAD FINDINGS:    Unchanged encephalomalacia of the left parieto-occipitotemporal lobes with  resulting ex vacuo dilatation of the atrial trigone of the left lateral  ventricle. Smaller foci of encephalomalacia within the bilateral frontal lobes  at the vertex, and right basal ganglia, are also similar to prior. Decreased  attenuation within the periventricular white matter and patchy areas within the  subcortical white matter, similar to prior. No acute intracranial hemorrhage. No  abnormal extra-axial fluid collection. No abnormal areas of enhancement. No  acute osseous abnormalities. Trace mucosal thickening within the bilateral  maxillary sinuses. Elongation of the bilateral globes, which can be seen with  myopia.    CTA HEAD FINDINGS:    The bilateral anterior cerebral, middle cerebral, and posterior cerebral  arteries, as well as the basilar artery are patent.    Atherosclerotic calcification of the cavernous segments of the bilateral  internal carotid arteries, focal areas of mild to moderate narrowing on the  right and ectasia on the left.    No aneurysm.    CTA NECK FINDINGS:    The bilateral carotid arteries are patent. Atherosclerotic calcification at the  origins of the bilateral internal carotid

## 2024-12-13 ASSESSMENT — ENCOUNTER SYMPTOMS
PHOTOPHOBIA: 0
VOMITING: 0
SHORTNESS OF BREATH: 0
COUGH: 0
NAUSEA: 0
BACK PAIN: 0

## 2024-12-16 RX ORDER — CARBIDOPA AND LEVODOPA 25; 100 MG/1; MG/1
2 TABLET ORAL 3 TIMES DAILY
Qty: 180 TABLET | Refills: 5 | Status: SHIPPED | OUTPATIENT
Start: 2024-12-16

## 2024-12-16 NOTE — TELEPHONE ENCOUNTER
Requested Prescriptions     Pending Prescriptions Disp Refills    carbidopa-levodopa (SINEMET)  MG per tablet 90 tablet      Sig: Take 2 tablets by mouth 3 times daily       Last Office Visit: 12/5/2024  Next Office Visit: 6/5/2025  Last Medication Refill: 1st script from our office

## 2025-01-24 ENCOUNTER — TELEPHONE (OUTPATIENT)
Age: 80
End: 2025-01-24
Payer: COMMERCIAL

## 2025-01-24 NOTE — TELEPHONE ENCOUNTER
Attempted to remind patient of appt 01/27/2025 with SANDRA Boles.  No answer, lm with information.  HUB TO RELAY

## 2025-02-09 SDOH — ECONOMIC STABILITY: FOOD INSECURITY: WITHIN THE PAST 12 MONTHS, THE FOOD YOU BOUGHT JUST DIDN'T LAST AND YOU DIDN'T HAVE MONEY TO GET MORE.: NEVER TRUE

## 2025-02-09 SDOH — ECONOMIC STABILITY: FOOD INSECURITY: WITHIN THE PAST 12 MONTHS, YOU WORRIED THAT YOUR FOOD WOULD RUN OUT BEFORE YOU GOT MONEY TO BUY MORE.: NEVER TRUE

## 2025-02-09 SDOH — ECONOMIC STABILITY: INCOME INSECURITY: IN THE LAST 12 MONTHS, WAS THERE A TIME WHEN YOU WERE NOT ABLE TO PAY THE MORTGAGE OR RENT ON TIME?: NO

## 2025-02-09 SDOH — ECONOMIC STABILITY: TRANSPORTATION INSECURITY
IN THE PAST 12 MONTHS, HAS LACK OF TRANSPORTATION KEPT YOU FROM MEETINGS, WORK, OR FROM GETTING THINGS NEEDED FOR DAILY LIVING?: NO

## 2025-02-09 SDOH — HEALTH STABILITY: PHYSICAL HEALTH: ON AVERAGE, HOW MANY MINUTES DO YOU ENGAGE IN EXERCISE AT THIS LEVEL?: 0 MIN

## 2025-02-09 SDOH — HEALTH STABILITY: PHYSICAL HEALTH: ON AVERAGE, HOW MANY DAYS PER WEEK DO YOU ENGAGE IN MODERATE TO STRENUOUS EXERCISE (LIKE A BRISK WALK)?: 0 DAYS

## 2025-02-09 SDOH — ECONOMIC STABILITY: TRANSPORTATION INSECURITY
IN THE PAST 12 MONTHS, HAS THE LACK OF TRANSPORTATION KEPT YOU FROM MEDICAL APPOINTMENTS OR FROM GETTING MEDICATIONS?: NO

## 2025-02-09 ASSESSMENT — PATIENT HEALTH QUESTIONNAIRE - PHQ9
SUM OF ALL RESPONSES TO PHQ QUESTIONS 1-9: 14
4. FEELING TIRED OR HAVING LITTLE ENERGY: NEARLY EVERY DAY
SUM OF ALL RESPONSES TO PHQ QUESTIONS 1-9: 14
8. MOVING OR SPEAKING SO SLOWLY THAT OTHER PEOPLE COULD HAVE NOTICED. OR THE OPPOSITE, BEING SO FIGETY OR RESTLESS THAT YOU HAVE BEEN MOVING AROUND A LOT MORE THAN USUAL: NOT AT ALL
SUM OF ALL RESPONSES TO PHQ9 QUESTIONS 1 & 2: 6
1. LITTLE INTEREST OR PLEASURE IN DOING THINGS: NEARLY EVERY DAY
2. FEELING DOWN, DEPRESSED OR HOPELESS: NEARLY EVERY DAY
10. IF YOU CHECKED OFF ANY PROBLEMS, HOW DIFFICULT HAVE THESE PROBLEMS MADE IT FOR YOU TO DO YOUR WORK, TAKE CARE OF THINGS AT HOME, OR GET ALONG WITH OTHER PEOPLE: SOMEWHAT DIFFICULT
3. TROUBLE FALLING OR STAYING ASLEEP: NEARLY EVERY DAY
9. THOUGHTS THAT YOU WOULD BE BETTER OFF DEAD, OR OF HURTING YOURSELF: NOT AT ALL
5. POOR APPETITE OR OVEREATING: NOT AT ALL
6. FEELING BAD ABOUT YOURSELF - OR THAT YOU ARE A FAILURE OR HAVE LET YOURSELF OR YOUR FAMILY DOWN: SEVERAL DAYS
SUM OF ALL RESPONSES TO PHQ QUESTIONS 1-9: 14
SUM OF ALL RESPONSES TO PHQ QUESTIONS 1-9: 14
7. TROUBLE CONCENTRATING ON THINGS, SUCH AS READING THE NEWSPAPER OR WATCHING TELEVISION: SEVERAL DAYS

## 2025-02-09 ASSESSMENT — LIFESTYLE VARIABLES
HOW MANY STANDARD DRINKS CONTAINING ALCOHOL DO YOU HAVE ON A TYPICAL DAY: 0
HOW OFTEN DO YOU HAVE SIX OR MORE DRINKS ON ONE OCCASION: 1
HOW OFTEN DO YOU HAVE A DRINK CONTAINING ALCOHOL: NEVER
HOW OFTEN DO YOU HAVE A DRINK CONTAINING ALCOHOL: 1
HOW MANY STANDARD DRINKS CONTAINING ALCOHOL DO YOU HAVE ON A TYPICAL DAY: PATIENT DOES NOT DRINK

## 2025-02-10 ENCOUNTER — OFFICE VISIT (OUTPATIENT)
Dept: INTERNAL MEDICINE | Age: 80
End: 2025-02-10

## 2025-02-10 VITALS
OXYGEN SATURATION: 98 % | WEIGHT: 236 LBS | HEART RATE: 116 BPM | DIASTOLIC BLOOD PRESSURE: 62 MMHG | HEIGHT: 73 IN | BODY MASS INDEX: 31.28 KG/M2 | SYSTOLIC BLOOD PRESSURE: 120 MMHG

## 2025-02-10 DIAGNOSIS — Z79.4 TYPE 2 DIABETES MELLITUS WITH DIABETIC NEPHROPATHY, WITH LONG-TERM CURRENT USE OF INSULIN (HCC): ICD-10-CM

## 2025-02-10 DIAGNOSIS — E11.21 TYPE 2 DIABETES MELLITUS WITH DIABETIC NEPHROPATHY, WITH LONG-TERM CURRENT USE OF INSULIN (HCC): ICD-10-CM

## 2025-02-10 DIAGNOSIS — F01.C3 SEVERE VASCULAR DEMENTIA WITH MOOD DISTURBANCE (HCC): ICD-10-CM

## 2025-02-10 DIAGNOSIS — R29.6 FREQUENT FALLS: ICD-10-CM

## 2025-02-10 DIAGNOSIS — Z00.00 MEDICARE ANNUAL WELLNESS VISIT, SUBSEQUENT: Primary | ICD-10-CM

## 2025-02-10 DIAGNOSIS — E55.9 VITAMIN D DEFICIENCY: ICD-10-CM

## 2025-02-10 DIAGNOSIS — N18.32 CHRONIC RENAL FAILURE, STAGE 3B (HCC): ICD-10-CM

## 2025-02-10 DIAGNOSIS — E29.1 HYPOGONADISM IN MALE: ICD-10-CM

## 2025-02-10 DIAGNOSIS — G21.4 VASCULAR PARKINSONISM (HCC): ICD-10-CM

## 2025-02-10 DIAGNOSIS — I10 PRIMARY HYPERTENSION: ICD-10-CM

## 2025-02-10 DIAGNOSIS — E03.9 ACQUIRED HYPOTHYROIDISM: ICD-10-CM

## 2025-02-10 PROBLEM — I82.409 DEEP VEIN THROMBOSIS (DVT) OF LOWER EXTREMITY (HCC): Status: ACTIVE | Noted: 2025-02-10

## 2025-02-10 PROBLEM — E34.9 TESTOSTERONE DEFICIENCY: Status: ACTIVE | Noted: 2025-02-10

## 2025-02-10 PROBLEM — F03.90 MAJOR NEUROCOGNITIVE DISORDER (HCC): Status: ACTIVE | Noted: 2023-03-01

## 2025-02-10 PROBLEM — F01.C0 SEVERE VASCULAR DEMENTIA WITHOUT BEHAVIORAL DISTURBANCE, PSYCHOTIC DISTURBANCE, MOOD DISTURBANCE, OR ANXIETY (HCC): Status: ACTIVE | Noted: 2025-02-10

## 2025-02-10 LAB
25(OH)D3 SERPL-MCNC: 100.2 NG/ML
ALBUMIN SERPL-MCNC: 4.1 G/DL (ref 3.5–5.2)
ALP SERPL-CCNC: 96 U/L (ref 40–129)
ALT SERPL-CCNC: 9 U/L (ref 5–41)
ANION GAP SERPL CALCULATED.3IONS-SCNC: 16 MMOL/L (ref 8–16)
AST SERPL-CCNC: 18 U/L (ref 5–40)
BILIRUB SERPL-MCNC: 0.4 MG/DL (ref 0.2–1.2)
BUN SERPL-MCNC: 39 MG/DL (ref 8–23)
CALCIUM SERPL-MCNC: 10 MG/DL (ref 8.8–10.2)
CHLORIDE SERPL-SCNC: 104 MMOL/L (ref 98–107)
CHOLEST SERPL-MCNC: 139 MG/DL (ref 0–199)
CO2 SERPL-SCNC: 23 MMOL/L (ref 22–29)
CREAT SERPL-MCNC: 1.7 MG/DL (ref 0.7–1.2)
ERYTHROCYTE [DISTWIDTH] IN BLOOD BY AUTOMATED COUNT: 13.2 % (ref 11.5–14.5)
GLUCOSE SERPL-MCNC: 192 MG/DL (ref 70–99)
HBA1C MFR BLD: 8 % (ref 4–5.6)
HCT VFR BLD AUTO: 44.8 % (ref 42–52)
HDLC SERPL-MCNC: 66 MG/DL (ref 40–60)
HGB BLD-MCNC: 14.3 G/DL (ref 14–18)
LDLC SERPL CALC-MCNC: 51 MG/DL
MCH RBC QN AUTO: 32.6 PG (ref 27–31)
MCHC RBC AUTO-ENTMCNC: 31.9 G/DL (ref 33–37)
MCV RBC AUTO: 102.1 FL (ref 80–94)
PLATELET # BLD AUTO: 182 K/UL (ref 130–400)
PMV BLD AUTO: 11.5 FL (ref 9.4–12.4)
POTASSIUM SERPL-SCNC: 5.2 MMOL/L (ref 3.5–5.1)
PROT SERPL-MCNC: 7.1 G/DL (ref 6.4–8.3)
RBC # BLD AUTO: 4.39 M/UL (ref 4.7–6.1)
SODIUM SERPL-SCNC: 143 MMOL/L (ref 136–145)
TESTOST SERPL-MCNC: 20.7 NG/DL (ref 193–740)
TRIGL SERPL-MCNC: 110 MG/DL (ref 0–149)
TSH SERPL DL<=0.005 MIU/L-ACNC: 3.15 UIU/ML (ref 0.27–4.2)
WBC # BLD AUTO: 6.7 K/UL (ref 4.8–10.8)

## 2025-02-10 RX ORDER — DONEPEZIL HYDROCHLORIDE 10 MG/1
10 TABLET, FILM COATED ORAL NIGHTLY
Qty: 90 TABLET | Refills: 3 | Status: SHIPPED | OUTPATIENT
Start: 2025-02-10

## 2025-02-10 RX ORDER — ATORVASTATIN CALCIUM 40 MG/1
40 TABLET, FILM COATED ORAL DAILY
Qty: 90 TABLET | Refills: 3 | Status: SHIPPED | OUTPATIENT
Start: 2025-02-10

## 2025-02-10 NOTE — PROGRESS NOTES
Basophils % 0.6 0.1 - 1.2 %    Neutrophils Absolute 2.99 1.56 - 6.13 K/uL    Lymphocytes Absolute 1.36 1.18 - 3.74 K/uL    Monocytes Absolute 0.45 0.24 - 0.82 K/uL    Eosinophils Absolute 0.10 0.04 - 0.54 K/uL    Basophils Absolute 0.03 0.01 - 0.08 K/uL       ASSESSMENT/ PLAN:  Assessment & Plan  1. Medicare annual wellness visit, subsequent. Medicare wellness visit.  A referral to a  will be initiated to assist with planning for his care during the absence of his primary caregiver. A prescription for a walker equipped with wheels and a seat will be provided. Laboratory tests will be conducted today, and the results will be communicated upon receipt.  Chart, medications, labs, vaccines reviewed.  Keep up to date with routine care and follow up.  Call with any problems or complaints.  Keep up to date with routine screening recomendations and vaccines.   2. Type 2 diabetes mellitus with diabetic nephropathy, with long-term current use of insulin ([facility])- hga1c rechecked and 8.0- increase tresiba to 18 units nightly- new script sent to [facility]   A refill for Tresiba will be sent to [facility]. He is advised to continue monitoring his blood sugar levels, especially while at the assisted living facility. Arrangements have been made for a private caregiver to check his blood sugar daily and change the sensor every 10 days.  3. Severe vascular dementia with mood disturbance ([facility])- . Depression.  He is currently on antidepressants but reports feeling more depressed. The possibility of adding melatonin was discussed but not prescribed. He is encouraged to increase social interactions and engage in physical therapy to help improve mood.worsening reviewed recent neuro note  4. Vascular parkinsonism (HCC)- worsening   5. Frequent falls. Fall prevention.  A referral for physical therapy will be made to address fall prevention. He is advised to attend physical therapy sessions to improve mobility and prevent future

## 2025-02-11 PROBLEM — G93.40 ENCEPHALOPATHY: Status: RESOLVED | Noted: 2020-03-04 | Resolved: 2025-02-11

## 2025-02-11 PROBLEM — N18.30 HYPERTENSIVE KIDNEY DISEASE, STAGE III (HCC): Status: RESOLVED | Noted: 2019-06-08 | Resolved: 2025-02-11

## 2025-02-11 PROBLEM — I12.9 HYPERTENSIVE KIDNEY DISEASE, STAGE III (HCC): Status: RESOLVED | Noted: 2019-06-08 | Resolved: 2025-02-11

## 2025-02-11 RX ORDER — INSULIN DEGLUDEC 200 U/ML
18 INJECTION, SOLUTION SUBCUTANEOUS NIGHTLY
Qty: 9 ML | Refills: 0 | Status: SHIPPED | OUTPATIENT
Start: 2025-02-11

## 2025-02-21 ENCOUNTER — CARE COORDINATION (OUTPATIENT)
Dept: CARE COORDINATION | Age: 80
End: 2025-02-21

## 2025-02-21 NOTE — CARE COORDINATION
Ambulatory Care Coordination Note     2/21/2025 2:41 PM     Spouse/Partner outreach attempt by this ACM today to offer care management services. ACM was unable to reach the spouse/partner  by telephone today;   left voice message requesting a return phone call to this ACM.     ACM: Guillermina Denis RN     Care Summary Note:     PCP/Specialist follow up:   Future Appointments         Provider Specialty Dept Phone    5/30/2025 1:30 PM Adore Thomas MD Internal Medicine [phone]    6/5/2025 10:45 AM Navneet Bridges MD Neurology [phone]    10/9/2025 1:30 PM Benjamin Zacarias PA-C Hematology and Oncology [phone]    10/9/2025 1:30 PM SCHEDULE, [facility] MED ONC MA Infusion Therapy [phone]            Follow Up:   Plan for next ACM outreach in approximately 1 week to complete:  - outreach attempt to offer care management services.                 Guillermina Denis RN  Ambulatory Care Manager   C. [phone]      Guillermina Denis RN  Ambulatory Care Manager   C. [phone]

## 2025-03-05 DIAGNOSIS — E11.21 TYPE 2 DIABETES MELLITUS WITH DIABETIC NEPHROPATHY, WITH LONG-TERM CURRENT USE OF INSULIN (HCC): ICD-10-CM

## 2025-03-05 DIAGNOSIS — Z79.4 TYPE 2 DIABETES MELLITUS WITH DIABETIC NEPHROPATHY, WITH LONG-TERM CURRENT USE OF INSULIN (HCC): ICD-10-CM

## 2025-03-05 RX ORDER — ACYCLOVIR 400 MG/1
TABLET ORAL
Qty: 3 EACH | Refills: 3 | Status: SHIPPED | OUTPATIENT
Start: 2025-03-05 | End: 2025-03-06

## 2025-03-06 DIAGNOSIS — E11.21 TYPE 2 DIABETES MELLITUS WITH DIABETIC NEPHROPATHY, WITH LONG-TERM CURRENT USE OF INSULIN (HCC): ICD-10-CM

## 2025-03-06 DIAGNOSIS — Z79.4 TYPE 2 DIABETES MELLITUS WITH DIABETIC NEPHROPATHY, WITH LONG-TERM CURRENT USE OF INSULIN (HCC): ICD-10-CM

## 2025-03-06 RX ORDER — ACYCLOVIR 400 MG/1
TABLET ORAL
Qty: 3 EACH | Refills: 3 | Status: SHIPPED | OUTPATIENT
Start: 2025-03-06

## 2025-03-10 DIAGNOSIS — E11.21 TYPE 2 DIABETES MELLITUS WITH DIABETIC NEPHROPATHY, WITH LONG-TERM CURRENT USE OF INSULIN (HCC): ICD-10-CM

## 2025-03-10 DIAGNOSIS — Z79.4 TYPE 2 DIABETES MELLITUS WITH DIABETIC NEPHROPATHY, WITH LONG-TERM CURRENT USE OF INSULIN (HCC): ICD-10-CM

## 2025-03-10 RX ORDER — INSULIN ASPART 100 [IU]/ML
INJECTION, SOLUTION INTRAVENOUS; SUBCUTANEOUS
Qty: 5 ADJUSTABLE DOSE PRE-FILLED PEN SYRINGE | Refills: 3 | Status: SHIPPED | OUTPATIENT
Start: 2025-03-10

## 2025-03-10 RX ORDER — INSULIN DEGLUDEC 200 U/ML
18 INJECTION, SOLUTION SUBCUTANEOUS NIGHTLY
Qty: 9 ADJUSTABLE DOSE PRE-FILLED PEN SYRINGE | Refills: 3 | Status: SHIPPED | OUTPATIENT
Start: 2025-03-10 | End: 2025-06-08

## 2025-03-21 ENCOUNTER — CARE COORDINATION (OUTPATIENT)
Dept: CARE COORDINATION | Age: 80
End: 2025-03-21

## 2025-03-21 NOTE — CARE COORDINATION
Ambulatory Care Coordination Note     3/21/2025 11:32 AM     caregiverHelene  outreach attempt by this [facility] today to offer care management services. AC was unable to reach the familyhelene  by telephone today;   left voice message requesting a return phone call to this AC.     Patient closed (unable to reach patient) from the High Risk Care Management program on 3/21/2025.  Family  unable to reach patient or family .  Care management goals have been completed. No further Ambulatory Care Manager follow up scheduled.      Guillermina Denis RN  Ambulatory Care Manager   C. [phone]

## 2025-03-21 NOTE — CARE COORDINATION
Ambulatory Care Coordination Note     3/21/2025 12:07 PM     Patient Current Location:  [place]     This patient was received as a referral from Ambulatory Care Manager .    Family, Carolina Friend   contacted the family by telephone. Verified name and  with family as identifiers. Provided introduction to self, and explanation of the ACM role.   Family accepted care management services at this time.          ACM: Guillermina Denis RN     Challenges to be reviewed by the provider   Additional needs identified to be addressed with provider No  none               Method of communication with provider: none.    Utilization: Patient has not had any utilization since our last call.     Care Summary Note: RN spoke with a family friend of the patient, who reported that the patient is currently doing well. primary caregiver, is planning to travel and is seeking available respite care options for her upcoming trip. ACM noted family reports  the patient has memory decline  and requires assistance with activities of daily living, meal planning, and safety support.    The caregiver is also inquiring about future caregiving services for when she is no longer able to assist with the patient’s care. Currently, the patient only has Medicare Part A and does not have Medicare Part B. The patient lives in friends home. Caregiver considering respite possibilities. Family ok with SW referral     ACM provided education to the caregiver about managing diabetes. The patient uses a Dexcom continuous glucose monitor, and family members help administer insulin on the patient’s behalf. Concerns have been raised regarding the patient's ability to manage insulin independently. The caregiver was educated on diabetic exams and encouraged to notify the office about any needs or questions as they arise. The family expressed understanding and agreed to do so.    ACM to follow-up     Offered patient enrollment in the Remote Patient Monitoring (RPM)

## 2025-03-26 ENCOUNTER — CARE COORDINATION (OUTPATIENT)
Dept: CARE COORDINATION | Age: 80
End: 2025-03-26

## 2025-03-26 NOTE — CARE COORDINATION
Attempt to initiate SW referral for information on Medicare part B, respite and waiver. Unable to speak to patient/caregiver. Left message.

## 2025-04-01 ENCOUNTER — CARE COORDINATION (OUTPATIENT)
Dept: CARE COORDINATION | Age: 80
End: 2025-04-01

## 2025-04-01 NOTE — CARE COORDINATION
Ambulatory Care Coordination Note     2025 3:05 PM     Patient Current Location:  [place]     ACM contacted the caregiver by telephone. Verified name and  with caregiver as identifiers.         ACM: Guillermina Denis RN     Challenges to be reviewed by the provider   Additional needs identified to be addressed with provider No  none               Method of communication with provider: none.    Utilization: Patient has not had any utilization since our last call.     Care Summary Note:   RN spoke with the patient's caregiver, who reported that the patient is currently doing okay. The caregiver indicated there is no need for a meeting at this time and confirmed that they will follow up with the assisted living facility regarding any potential needs for the patient. The RN noted that social work had made attempts to reach out to the caregiver.  RN provided the caregiver with social work contact information, and the caregiver acknowledged this without any questions. The caregiver agreed to speak with social work during their outreach, as they have a follow-up scheduled with the assisted living facility next week. The RN encouraged the caregiver to reach out with any questions or concerns as they arise, and the caregiver expressed understanding and agreed to do so. The ACM will follow up.     Offered patient enrollment in the Remote Patient Monitoring (RPM) program for in-home monitoring: Yes, but did not enroll at this time: already monitoring with home equipment.     Assessments Completed:   No changes since last call    Medications Reviewed:   Completed during this call    Advance Care Planning:   Not on file; education provided     Care Planning:   Education Documentation  Influenza Vaccine, taught by Guillermina Denis RN at 2025  3:10 PM.  Learner: Patient  Readiness: Acceptance  Method: Explanation  Response: Verbalizes Understanding    Pneumovax Recommendation, taught by Guillermina Denis RN

## 2025-04-10 ENCOUNTER — CARE COORDINATION (OUTPATIENT)
Dept: CARE COORDINATION | Age: 80
End: 2025-04-10

## 2025-04-10 NOTE — CARE COORDINATION
Ambulatory Care Coordination Note     4/10/2025 1:04 PM     ACM outreach attempt by this ACM today to perform care management follow up . ACM was unable to reach the patient by telephone today;   left voice message requesting a return phone call to this ACM.     ACM: Guillermina Denis RN     Care Summary Note:     PCP/Specialist follow up:   Future Appointments         Provider Specialty Dept Phone    4/16/2025 2:45 PM Benjamin Finch PT Physical Therapy [phone]    5/30/2025 1:30 PM Adore Thomas MD Internal Medicine [phone]    6/5/2025 10:45 AM Navneet Bridges MD Neurology [phone]    10/9/2025 1:30 PM Benjamin Zacarias, PA-C Hematology and Oncology [phone]    10/9/2025 1:30 PM SCHEDULE, [facility] ONC MA Infusion Therapy [phone]            Follow Up:   Plan for next ACM outreach in approximately 1 week to complete:  - SDOH assessments  - advance care planning  - education .                 Guillermina Denis RN  Ambulatory Care Manager   C. [phone]

## 2025-04-11 ENCOUNTER — TELEPHONE (OUTPATIENT)
Dept: INTERNAL MEDICINE | Age: 80
End: 2025-04-11

## 2025-04-11 DIAGNOSIS — Z86.718 HISTORY OF DVT IN ADULTHOOD: Primary | ICD-10-CM

## 2025-04-11 NOTE — TELEPHONE ENCOUNTER
His daughter, Carolina, noted that he was given 20mg of Xarelto and she says that he has always been on 15mg due to fall risk and bleeding.   Can they finish the bottle of 20mg that was just opened?  Or go back to the 15mg?  Or just stop it all-together?

## 2025-04-14 NOTE — TELEPHONE ENCOUNTER
Sorry we had 20mg in chart for some reason- bring all pill bottles next time so we can be sure have correct-- it is ok to take the 20mg for now and ok to finish current bottle but I did send the 15mg so they would have on hand for the future

## 2025-04-16 ENCOUNTER — HOSPITAL ENCOUNTER (OUTPATIENT)
Dept: PHYSICAL THERAPY | Age: 80
Setting detail: THERAPIES SERIES
Discharge: HOME | End: 2025-04-16
Payer: COMMERCIAL

## 2025-04-16 VITALS — SYSTOLIC BLOOD PRESSURE: 127 MMHG | HEART RATE: 67 BPM | OXYGEN SATURATION: 99 % | DIASTOLIC BLOOD PRESSURE: 73 MMHG

## 2025-04-16 PROCEDURE — 97162 PT EVAL MOD COMPLEX 30 MIN: CPT

## 2025-04-16 PROCEDURE — 97530 THERAPEUTIC ACTIVITIES: CPT

## 2025-04-16 ASSESSMENT — 10 METER WALK TEST (10METWT)
TRIAL 1: TIME TO WALK 10 METERS: 13.01
AVERAGE VELOCITY - METERS PER SECOND: 0.46
AVERAGE VELOCITY: 13

## 2025-04-16 NOTE — PROGRESS NOTES
ADLs: Needs assistance  Ambulation Assistance: Independent  Prior Level of Assist for Transfers: Independent  Active : No    OBJECTIVE EXAMINATION   Restrictions:             Review of Systems:  Vision: Within Functional Limits  Hearing Exceptions: Bilateral hearing aid  Vital Signs  Pulse: 67  BP: 127/73  MAP (Calculated): 91  Oxygen Therapy  SpO2: 99 %    VBI Screening / Lumbar Screening:        Regional Screen:         Observations:  General Observations  Cervical: Forward head posture  Thoracic Spine: Thoracic Hyperkyphosis  Shoulders: Rounded    Palpation:        Ambulation/Gait (if applicable):  Ambulation  Surface: Level tile  Device: Rollator  Assistance: Supervision  Gait Deviations: Slow Rola, Increased PRIYA, Decreased step height, Shuffles, Decreased arm swing    Balance Screen:  Romberg/Narrow Base of Support  Eyes Open: 60 seconds  Sway: Moderate  Strategy: Ankle  Single Leg Stance  Right Leg Eyes Open: 0 seconds  Left Leg Eyes Open: 0 seconds  Functional Reach Test  Functional Reach Test: 10 inches  Fall Risk (Score of <10 inches is fall risk): Yes    Neuro Screen:    Left AROM  Right AROM         AROM LLE (degrees)  LLE AROM : WNL    AROM RLE (degrees)  RLE AROM: WNL       Left Strength  Right Strength         Strength LLE  L Hip Flexion: 4/5  L Hip Extension: 4/5  L Hip ABduction: 4/5  L Hip ADduction: 4/5  L Knee Flexion: 4/5  L Knee Extension: 4/5  L Ankle Dorsiflexion: 4/5  L Ankle Plantar Flexion: 4/5    Strength RLE  R Hip Flexion: 4/5  R Hip Extension: 4/5  R Hip ABduction: 4/5  R Hip ADduction: 4/5  R Knee Flexion: 4/5  R Knee Extension: 4/5  R Ankle Dorsiflexion: 4/5  R Ankle Plantar flexion: 4/5        Balance/Gait Assessment(s) Performed:      Additional Finding(s) (if applicable):    Ambulation/Gait (if applicable):   Ambulation  Surface: Level tile  Device: Rollator  Assistance: Supervision  Gait Deviations: Slow Rola, Increased PRIYA, Decreased step height, Shuffles, Decreased

## 2025-04-21 ENCOUNTER — HOSPITAL ENCOUNTER (OUTPATIENT)
Dept: PHYSICAL THERAPY | Age: 80
Setting detail: THERAPIES SERIES
Discharge: HOME | End: 2025-04-21
Payer: COMMERCIAL

## 2025-04-21 PROCEDURE — 97110 THERAPEUTIC EXERCISES: CPT

## 2025-04-21 NOTE — PROGRESS NOTES
Physical Therapy: Daily Note   Patient: Eliseo Byrd (79 y.o. male)   Examination Date: 2025  Plan of Care/Certification Expiration Date: 25    No data recorded   :  1945 # of Visits since SOC:   2   MRN: [id number]  CSN: [id number] Start of Care Date:   2025   Insurance: Payor: [facility] / Plan: [employer] [facility] / Product Type: *No Product type* /   Insurance ID: [id number] - ([employer]) Secondary Insurance (if applicable):    Referring Physician: Adore Thomas MD     PCP: Adore Thomas MD Visits to Date/Visits Approved: 2 /      No Show/Cancelled Appts:   /       Medical Diagnosis: Repeated falls [R29.6] Unsteady gait; LE weakness  Treatment Diagnosis: Unsteady gait; LE weakness        SUBJECTIVE EXAMINATION   Pain Level: Pain Screening  Patient Currently in Pain: Denies    Patient Comments: Subjective: He denies pain today.  He has not fallen over the weekend.    HEP Compliance: Good        OBJECTIVE EXAMINATION   Restrictions:  No data recorded No data recorded No data recorded               TREATMENT     Exercises:      Treatment Reasoning    Exercise 2: LBE, 5-10 min; 6 min, Level 2  Exercise 3: Supine, TA series, 10 reps  Exercise 4: Supine, lower trunk rotation, 10 reps  Exercise 5: Supine, SLR, hip abd, 10 reps  Exercise 6: Supine, bridges, 10 reps; Supine, SLR, 10 reps  Exercise 7: Sitting, LAQ, 3#, 15 reps  Exercise 8: Sitting, t band leg curls, 15 reps  Exercise 9: Sitting, t band hip abd; ball squeezes, 15 reps  Exercise 10: Sit to stand, mat to table, 10 reps  Exercise 12: Standing, hip abd, hip ext, mini squat, heel raise, 10 reps; Standing, high knee march, 10 reps  Exercise 19: Sitting, ballon taps, 1-3 min                          Pt Education:         ASSESSMENT     Assessment: Assessment: Mr. Byrd is seen today for 1st PT session following initial PT eval.  He is somewhat lethargic but cooperative.  He requires verbal cueing with ther ex.  He performs ther ex as per

## 2025-04-22 ENCOUNTER — CARE COORDINATION (OUTPATIENT)
Dept: CARE COORDINATION | Age: 80
End: 2025-04-22

## 2025-04-22 NOTE — CARE COORDINATION
Final attempt to reach patient. Call was answered by Carolina. She stated that it was not a good time. She will call back in the morning.

## 2025-04-23 ENCOUNTER — HOSPITAL ENCOUNTER (OUTPATIENT)
Dept: PHYSICAL THERAPY | Age: 80
Setting detail: THERAPIES SERIES
Discharge: HOME | End: 2025-04-23
Payer: COMMERCIAL

## 2025-04-23 PROCEDURE — 97110 THERAPEUTIC EXERCISES: CPT

## 2025-04-23 NOTE — PROGRESS NOTES
Physical Therapy  Daily Treatment Note  Date: 2025  Patient Name: Eliseo Byrd  MRN: [id number]     :   1945    Subjective:      PT Visit Information  PT Insurance Information: [facility]   [facility]  Total # of Visits to Date: 3  Plan of Care/Certification Expiration Date: 25  Progress Note Due Date: 25  Subjective: Patient reports he isnt feeling great and just feels weak. Denies pain.    Pain Screening  Patient Currently in Pain: Denies       Treatment Activities:   Exercises  Exercise 1: 6 MWT with VS     OR  see below  Exercise 2: LBE, 5-10 min; 6 min, Level 2  Exercise 3: Supine, TA series, 10 reps  Exercise 4: Supine, lower trunk rotation, 10 reps  Exercise 5: Supine, SLR, hip abd, 10 reps  Exercise 6: Supine, bridges, 10 reps; Supine, SLR, 10 reps  Exercise 7: Sitting, LAQ, 3#, 15 reps  Exercise 8: Sitting, t band leg curls, 15 reps  Exercise 9: Sitting, t band hip abd; ball squeezes, 15 reps  Exercise 10: Sit to stand, mat to table, 10 reps  Exercise 11: Standing, step ups, 4\", 10 reps  Exercise 12: Standing, hip abd, hip ext, mini squat, heel raise, 10 reps; Standing, high knee march, 10 reps  Exercise 13: Standing, //, narrow, PRIYA, EO/EC, 30 sec not today  Exercise 14: Standing, //, semi-tandem, EO/EC, 30 sec not today  Exercise 15: Standing, taps, 4\", 10 reps  Exercise 16: Standing next to mat table, transfer to all 4's, return to standing.  (progress to lying prone, rolling over, then to all 4's, the standing) not today  Exercise 17: //, forw/back/side-, 3 trips not today, in use  Exercise 18: //, hurdles, forw/side-side, 3 trips each not today, in use  Exercise 19: Sitting, ballon taps, 1-3 min; 3 min  Exercise 20: Standing in corner, ballon taps, 1-3 min     Assessment:   Conditions Requiring Skilled Therapeutic Intervention  Body Structures, Functions, Activity Limitations Requiring Skilled Therapeutic Intervention: Decreased functional mobility ;Decreased ADL status;Decreased

## 2025-04-28 ENCOUNTER — HOSPITAL ENCOUNTER (OUTPATIENT)
Dept: PHYSICAL THERAPY | Age: 80
Setting detail: THERAPIES SERIES
Discharge: HOME | End: 2025-04-28
Payer: COMMERCIAL

## 2025-04-28 PROCEDURE — 97110 THERAPEUTIC EXERCISES: CPT

## 2025-04-28 NOTE — PROGRESS NOTES
Physical Therapy  Daily Treatment Note  Date: 2025  Patient Name: Eliseo Byrd  MRN: [id number]     :   1945    Subjective:   General  Additional Pertinent Hx: 79 year old male has been referred for PT eval and treatment of unsteady gait and frequent falling. He enters PT dept with rollator.  PMH: T2DM; HLD; HTN; S/P Stroke; CKDIII.  He was prior PT at this faciltiy for unsteady gai.  PT Visit Information  PT Insurance Information: [facility]   ([facility])  Total # of Visits Approved: 12  Total # of Visits to Date: 4  Plan of Care/Certification Expiration Date: 25  Progress Note Due Date: 25  Referring Provider (secondary): Adore Thomas  Subjective: Patient reports no pain pre, during or post session.    Pain Screening  Patient Currently in Pain: Denies     Treatment Activities:    Exercises  Exercise 1: 6 MWT with VS     OR  see below  Exercise 2: LBE, 5-10 min; 6 min, Level 2  Exercise 3: Supine, TA series, 10 reps  Exercise 4: Supine, lower trunk rotation, 10 reps  Exercise 5: Supine, SLR,   hip abd, 10 reps   ext lag R>L,  Exercise 6: Supine, bridges, 10 reps;  Exercise 7: Sitting, LAQ, 3#, 15 reps  Exercise 8: Sitting, t band leg curls, 15 reps  Exercise 9: Sitting, t band hip abd; ball squeezes, 15 reps  Exercise 10: Sit to stand, mat to table, 5 reps    Assessment:   Conditions Requiring Skilled Therapeutic Intervention  Body Structures, Functions, Activity Limitations Requiring Skilled Therapeutic Intervention: Decreased functional mobility ;Decreased ADL status;Decreased ROM;Decreased strength;Decreased high-level IADLs;Decreased balance;Decreased endurance;Decreased coordination;Decreased posture  Assessment: Patient has shuffling gait.  Verbal cues to increase step length.   He shows ext lag with kenny knees.   After supine exericses patient was unable to perform more than 5 sit to stands from taller mat table and was unable to control decent with 5 sits he performed.  Standing exericses not

## 2025-04-30 ENCOUNTER — HOSPITAL ENCOUNTER (OUTPATIENT)
Dept: PHYSICAL THERAPY | Age: 80
Setting detail: THERAPIES SERIES
Discharge: HOME | End: 2025-04-30
Payer: COMMERCIAL

## 2025-04-30 PROCEDURE — 97110 THERAPEUTIC EXERCISES: CPT

## 2025-04-30 NOTE — PROGRESS NOTES
Physical Therapy  Daily Treatment Note  Date: 2025  Patient Name: Eliseo Byrd  MRN: [id number]     :   1945    Subjective:      PT Visit Information  PT Insurance Information: [facility]   ([employer])  Total # of Visits Approved: 12  Total # of Visits to Date: 5  Plan of Care/Certification Expiration Date: 25  Progress Note Due Date: 25  Referring Provider (secondary): Adore Thomas  Subjective: Patient reports no pain this date and states he is doing good.    Pain Screening  Patient Currently in Pain: Denies       Treatment Activities:   Exercises  Exercise 1: 6 MWT with VS     OR  see below  Exercise 2: LBE, 5-10 min; 6 min, Level 2  Exercise 3: Supine, TA series, 10 reps  Exercise 4: Supine, lower trunk rotation, 10 reps  Exercise 5: Supine, SLR,   hip abd, 10 reps   ext lag R>L,  Exercise 6: Supine, bridges, 10 reps;  Exercise 7: Sitting, LAQ, 3#, 15 reps  Exercise 8: Sitting, t band leg curls, 15 reps  Exercise 9: Sitting, t band hip abd; ball squeezes, 20 reps  Exercise 10: Sit to stand, mat to table, 5 reps  Exercise 11: Standing, step ups, 4\", 10 reps  Exercise 12: Standing, hip abd, hip ext, mini squat, heel raise, 10 reps; Standing, high knee march, 10 reps  Exercise 13: Standing, //, narrow, PRIYA, EO/EC, 30 sec not today  Exercise 14: Standing, //, semi-tandem, EO/EC, 30 sec not today  Exercise 15: Standing, taps, 4\", 10 reps  Exercise 16: Standing next to mat table, transfer to all 4's, return to standing.  (progress to lying prone, rolling over, then to all 4's, the standing) not today  Exercise 17: //, forw/back/side-, 3 trips; only 1 due to needing to rest.  Exercise 18: //, hurdles, forw/side-side, 3 trips each not today  Exercise 19: Sitting, ballon taps, 1-3 min; 3 min  Exercise 20: Standing in corner, ballon taps, 1-3 min     Assessment:   Conditions Requiring Skilled Therapeutic Intervention  Body Structures, Functions, Activity Limitations Requiring Skilled Therapeutic

## 2025-05-01 ENCOUNTER — CARE COORDINATION (OUTPATIENT)
Dept: CARE COORDINATION | Age: 80
End: 2025-05-01

## 2025-05-01 NOTE — CARE COORDINATION
Ambulatory Care Coordination Note     5/1/2025 3:42 PM     Patient outreach attempt by this ACM today to perform care management follow up . [facility] was unable to reach the patient, spouse/partner  by telephone today;   left voice message requesting a return phone call to this ACM.     ACM: Margoth Claros RN      PCP/Specialist follow up:   Future Appointments         Provider Specialty Dept Phone    5/6/2025 4:00 PM MayitoJessica abarca, LETICIA Physical Therapy [phone]    5/9/2025 2:00 PM Jessica Edmond PTA Physical Therapy [phone]    5/12/2025 2:00 PM Jessica Edmond PTA Physical Therapy [phone]    5/15/2025 2:00 PM Benjamin Finch, ANGIE Physical Therapy [phone]    5/30/2025 1:30 PM Adore Thomas MD Internal Medicine [phone]    6/5/2025 10:45 AM Navneet Bridges MD Neurology [phone]    10/9/2025 1:30 PM Benjamin Zacarias, PA-C Hematology and Oncology [phone]    10/9/2025 1:30 PM SCHEDULE, [facility] MED ONC MA Infusion Therapy [phone]            Follow Up:   Plan for next AC outreach in approximately 1 week to complete:  - SDOH assessments  - medication review  - advance care planning.

## 2025-05-02 ENCOUNTER — CARE COORDINATION (OUTPATIENT)
Dept: CARE COORDINATION | Age: 80
End: 2025-05-02

## 2025-05-02 DIAGNOSIS — E11.21 TYPE 2 DIABETES MELLITUS WITH DIABETIC NEPHROPATHY, WITH LONG-TERM CURRENT USE OF INSULIN (HCC): ICD-10-CM

## 2025-05-02 DIAGNOSIS — Z79.4 TYPE 2 DIABETES MELLITUS WITH DIABETIC NEPHROPATHY, WITH LONG-TERM CURRENT USE OF INSULIN (HCC): ICD-10-CM

## 2025-05-02 DIAGNOSIS — F01.C0 SEVERE VASCULAR DEMENTIA WITHOUT BEHAVIORAL DISTURBANCE, PSYCHOTIC DISTURBANCE, MOOD DISTURBANCE, OR ANXIETY (HCC): ICD-10-CM

## 2025-05-02 DIAGNOSIS — G21.4 VASCULAR PARKINSONISM (HCC): Primary | ICD-10-CM

## 2025-05-02 ASSESSMENT — SOCIAL DETERMINANTS OF HEALTH (SDOH)
HOW OFTEN DO YOU ATTENT MEETINGS OF THE CLUB OR ORGANIZATION YOU BELONG TO?: NEVER
HOW OFTEN DO YOU GET TOGETHER WITH FRIENDS OR RELATIVES?: NEVER
IN A TYPICAL WEEK, HOW MANY TIMES DO YOU TALK ON THE PHONE WITH FAMILY, FRIENDS, OR NEIGHBORS?: NEVER
DO YOU BELONG TO ANY CLUBS OR ORGANIZATIONS SUCH AS CHURCH GROUPS UNIONS, FRATERNAL OR ATHLETIC GROUPS, OR SCHOOL GROUPS?: NO
HOW OFTEN DO YOU ATTEND CHURCH OR RELIGIOUS SERVICES?: NEVER

## 2025-05-02 NOTE — CARE COORDINATION
Ambulatory Care Coordination Note     2025 3:25 PM     Patient Current Location:  Home: [address] [place] [id number]     ACM contacted the caregiver by telephone. Verified name and  with caregiver as identifiers.         ACM: Margoth Claros RN     Challenges to be reviewed by the provider   Additional needs identified to be addressed with provider Yes  Palliative Care referral               Method of communication with provider: chart routing.    Utilization: Patient has not had any utilization since our last call.     Care Summary Note: Caregiver continues to have some concerns about care of patient while she is out of the country later this year, plans to be going about 1 month. CG visited a assisted living facility in [place], [place] but has concerns that patient needs more hands on care and supervision than provided through the facility. Patient is unable to afford to stay at a SNF due to the private pay cost. Patient has been under CG care for the past 4 years and CG would like to know what patient care options are for the future. Patient is inactive and sleeps a lot during the day, relies on CG to respond to her CGM alerts if glucose is abnormal. CG stated pt has hearing deficit and does not awaken when his CGM alerts. CG makes sure pt takes his medications as well and these issues are concerning should pt stay at assisted facility for a month without caregiver. CG is considering talking to someone with [employer] about patient's situation and support needs in the future. ACM suggested caregiver discuss concerns with a  through Palliative Care program. Explained the program includes home visits with a nurse practitioner and cg may request visit with the  to discuss her concerns. CG voiced understanding and agreement for the referral.    Offered patient enrollment in the Remote Patient Monitoring (RPM) program for in-home monitoring: Yes, but did not enroll at this time:

## 2025-05-05 ENCOUNTER — CARE COORDINATION (OUTPATIENT)
Dept: CARE COORDINATION | Age: 80
End: 2025-05-05

## 2025-05-06 ENCOUNTER — HOSPITAL ENCOUNTER (OUTPATIENT)
Dept: PHYSICAL THERAPY | Age: 80
Setting detail: THERAPIES SERIES
Discharge: HOME | End: 2025-05-06
Payer: COMMERCIAL

## 2025-05-06 PROCEDURE — 97110 THERAPEUTIC EXERCISES: CPT

## 2025-05-06 NOTE — PROGRESS NOTES
Physical Therapy: Daily Note   Patient: Eliseo Byrd (79 y.o. male)   Examination Date: 2025  Plan of Care/Certification Expiration Date: 25    No data recorded   :  1945 # of Visits since SOC:   6   MRN: [id number]  CSN: [id number] Start of Care Date:   2025   Insurance: Payor: [facility] / Plan: [facility] / Product Type: *No Product type* /   Insurance ID: [id number] - ([employer]) Secondary Insurance (if applicable):    Referring Physician: Adore Thomas MD Polly Lebuhn   PCP: Adore Thomas MD Visits to Date/Visits Approved:     No Show/Cancelled Appts:   /       Medical Diagnosis: Repeated falls [R29.6]    Treatment Diagnosis: Unsteady gait; LE weakness        SUBJECTIVE EXAMINATION   Pain Level: Pain Screening  Patient Currently in Pain: Denies    Patient Comments: Subjective: I havent had any falls and I am doing okay.        TREATMENT     Exercises:  Therapeutic exercise (CPT 09013)   Treatment Reasoning    Exercise 1: 6 MWT with VS     OR  see below  Exercise 2: LBE, 5-10 min; 6 min, Level 2  Exercise 3: Supine, TA series, 10 reps-MAX CUES  Exercise 4: Supine, lower trunk rotation, 10 reps  Exercise 5: Supine, SLR,   hip abd, 10 reps   ext lag R>L,  Exercise 6: Supine, bridges, 10 reps;  Exercise 7: Sitting, LAQ, 3#, 15 reps  Exercise 8: Sitting, t band leg curls, 15 reps-red  Exercise 9: Sitting, t band hip abd; ball squeezes, 20 reps  Exercise 10: Sit to stand, mat to table, 5 reps  Exercise 11: Standing, step ups, 4\", 10 reps  Exercise 12: Standing, hip abd, hip ext, mini squat, heel raise, 10 reps; Standing, high knee march, 10 reps  Exercise 13: Standing, //, narrow, PRIYA, EO/EC, 30 sec not today  Exercise 14: Standing, //, semi-tandem, EO/EC, 30 sec not today  Exercise 15: Standing, taps, 4\", 10 reps  Exercise 16: Standing next to mat table, transfer to all 4's, return to standing.  (progress to lying prone, rolling over, then to all 4's, the standing) not

## 2025-05-09 ENCOUNTER — HOSPITAL ENCOUNTER (OUTPATIENT)
Dept: PHYSICAL THERAPY | Age: 80
Setting detail: THERAPIES SERIES
Discharge: HOME | End: 2025-05-09
Payer: COMMERCIAL

## 2025-05-09 PROCEDURE — 97110 THERAPEUTIC EXERCISES: CPT

## 2025-05-09 NOTE — PROGRESS NOTES
Physical Therapy: Daily Note   Patient: Eliseo Byrd (79 y.o. male)   Examination Date: 2025  Plan of Care/Certification Expiration Date: 25    No data recorded   :  1945 # of Visits since SOC:   7   MRN: [id number]  CSN: [id number] Start of Care Date:   2025   Insurance: Payor: [facility] / Plan: [employer] [facility] / Product Type: *No Product type* /   Insurance ID: [id number] - ([employer]) Secondary Insurance (if applicable):    Referring Physician: Adore Thomas MD Polly Lebuhn   PCP: Adore Thomas MD Visits to Date/Visits Approved:     No Show/Cancelled Appts:   /       Medical Diagnosis: Repeated falls [R29.6]    Treatment Diagnosis: Unsteady gait; LE weakness        SUBJECTIVE EXAMINATION   Pain Level: Pain Screening  Patient Currently in Pain: Denies    Patient Comments: Subjective: I was having pain in my neck earlier today.        TREATMENT     Exercises:  Therapeutic exercise (CPT 98407)   Treatment Reasoning    Exercise 1: 6 MWT with VS     OR  see below  Exercise 2: LBE, 5-10 min; 6 min, Level 2  Exercise 3: Supine, TA series, 10 reps-MOD -MAX CUES  Exercise 4: Supine, lower trunk rotation, 10 reps  Exercise 5: Supine, SLR,   hip abd, 10 reps   ext lag R>L,  Exercise 6: Supine, bridges, 10 reps;  Exercise 7: Sitting, LAQ, 3#, 15 reps  Exercise 8: Sitting, t band leg curls, 15 reps-red  Exercise 9: Sitting, t band hip abd; ball squeezes, 20 reps  Exercise 10: Sit to stand, mat to table, 5 reps  Exercise 11: Standing, step ups, 4\", 10 reps  Exercise 12: Standing, hip abd, hip ext, mini squat, heel raise, 10 reps; Standing, high knee march, 10 reps  Exercise 13: Standing, //, narrow, PRIYA, EO/EC, 30 sec not today  Exercise 14: Standing, //, semi-tandem, EO/EC, 30 sec not today  Exercise 15: Standing, taps, 4\", 10 reps  Exercise 16: Standing next to mat table, transfer to all 4's, return to standing.  (progress to lying prone, rolling over, then to all 4's, the standing) not

## 2025-05-12 ENCOUNTER — APPOINTMENT (OUTPATIENT)
Dept: PHYSICAL THERAPY | Age: 80
End: 2025-05-12
Payer: COMMERCIAL

## 2025-05-13 NOTE — CARE COORDINATION
Called caregiver - Carolina. She was not at home or able to talk long and requested call back. We agreed on call back tomorrow morning between 8-9 am. Electronically signed by Margoth Claros RN on 5/13/2025 at 5:24 PM

## 2025-05-14 ENCOUNTER — CARE COORDINATION (OUTPATIENT)
Dept: CARE COORDINATION | Age: 80
End: 2025-05-14

## 2025-05-14 RX ORDER — CHOLECALCIFEROL (VITAMIN D3) 1250 MCG
1 CAPSULE ORAL WEEKLY
COMMUNITY

## 2025-05-14 NOTE — CARE COORDINATION
Ambulatory Care Coordination Note     2025 9:12 AM     Patient Current Location:  Home: [address] [place] [id number]     ACM contacted the caregiver by telephone. Verified name and  with caregiver as identifiers.         ACM: Margoth Claros RN     Challenges to be reviewed by the provider   Additional needs identified to be addressed with provider No  none               Method of communication with provider: none.    Utilization: Patient has not had any utilization since our last call.     Care Summary Note: CG reported pt continues to need increased supervision at home. Patient fell early  morning while getting out of bed to the bathroom. CG believes pt's comforter slid onto the floor when he pushed back his covers and he tripped on this, falling. CG denied serious injury, noted a bruise on pt's hand. She called over 2 friends to help get pt up. Pt uses a heavy duty rollator when outdoors but finds it cumbersome when in the house so does not typically use it. Patient is participating in external PT at this time, CG unsure how much it is helping. CG left voicemail at [facility] last week, has not heard back and was encouraged to call again to schedule a home visit. Provided CG with phone number for  to discuss Medicare questions. Patient stated she is trying to adjust patient's insulins to reduce hypoglycemia, gave good teach back on treating low sugar with 4 oz of juice and rechecking glucose. She reported pt's CGM alerted for glucose of 65 and she use finger gucometer to verify - the result was 150 on the finger meter. CG may adjust Tresiba as low as 14 units if evening glucose is low and will try to titrate the Novolog as well. She reported fewer hypoglycemia episodes but more frequent high glucose alerts now. CG is keeping a journal, was encouraged to continue this good practice and review it along with all medications at pt's next follow up end of May. CG was agreeable to

## 2025-05-15 ENCOUNTER — CARE COORDINATION (OUTPATIENT)
Dept: CARE COORDINATION | Age: 80
End: 2025-05-15

## 2025-05-15 ENCOUNTER — HOSPITAL ENCOUNTER (OUTPATIENT)
Dept: PHYSICAL THERAPY | Age: 80
Setting detail: THERAPIES SERIES
Discharge: HOME | End: 2025-05-15
Payer: COMMERCIAL

## 2025-05-15 PROCEDURE — 97530 THERAPEUTIC ACTIVITIES: CPT

## 2025-05-15 ASSESSMENT — 10 METER WALK TEST (10METWT)
AVERAGE VELOCITY - METERS PER SECOND: 0.57
AVERAGE VELOCITY: 10.5
TRIAL 1: TIME TO WALK 10 METERS: 10.54

## 2025-05-15 NOTE — PROGRESS NOTES
Physical Therapy: Daily Note/Reassessment   Patient: Eliseo Byrd (79 y.o. male)   Examination Date: 05/15/2025  Plan of Care/Certification Expiration Date: 25    No data recorded   :  1945 # of Visits since SOC:   8   MRN: [id number]  CSN: [id number] Start of Care Date:   2025   Insurance: Payor: [place] [employer] / Plan: [employer] [facility] / Product Type: *No Product type* /   Insurance ID: [id number] - ([place] [employer]) Secondary Insurance (if applicable):    Referring Physician: Adore Thomas MD Polly Lebuhn   PCP: Adore Thomas MD Visits to Date/Visits Approved:     No Show/Cancelled Appts:   /       Medical Diagnosis: Repeated falls [R29.6] Unsteady gait; LE weakness  Treatment Diagnosis: Unsteady gait; LE weakness        SUBJECTIVE EXAMINATION   Pain Level: Pain Screening  Patient Currently in Pain: Denies    Patient Comments: Subjective: He denies pain today.  He relates falling 2 weeks ago.  He is unable to get up by himself.  He feels weak.    HEP Compliance: Good        OBJECTIVE EXAMINATION   Restrictions:  No data recorded No data recorded No data recorded      Hand Dominance:     Left  Right     Strength  Handle Setting 2: 66  Trial 1: 68  Trial 2: 65  Trial 3: 65  Average: 66 Handle Setting 2: 69  Trial 1: 72  Trial 2: 67  Trial 3: 68  Average: 69   Pinch Strength (if applicable)           Left Hand Edema  Right Hand Edema             Fine Motor Skills:        Ambulation/Gait (if applicable):   Ambulation  Surface: Level tile  Device: Rollator  Assistance: Supervision  Gait Deviations: Slow Rola, Increased PRIYA, Decreased step height, Shuffles, Decreased arm swing    Balance Screen:   Romberg/Narrow Base of Support  Eyes Open: 18 seconds  Sway: Moderate  Strategy: Ankle  Single Leg Stance  Right Leg Eyes Open: 0 seconds  Left Leg Eyes Open: 0 seconds  Functional Reach Test  Functional Reach Test: 13 inches  Fall Risk (Score of <10 inches is fall risk): Yes      Balance/Gait

## 2025-05-23 ENCOUNTER — OFFICE VISIT (OUTPATIENT)
Dept: PALLATIVE CARE | Age: 80
End: 2025-05-23
Payer: COMMERCIAL

## 2025-05-23 DIAGNOSIS — F01.C3 SEVERE VASCULAR DEMENTIA WITH MOOD DISTURBANCE (HCC): Primary | ICD-10-CM

## 2025-05-23 DIAGNOSIS — Z71.89 ENCOUNTER FOR SUPPORT TO CAREGIVER: ICD-10-CM

## 2025-05-23 DIAGNOSIS — Z86.73 HISTORY OF STROKE IN ADULTHOOD: ICD-10-CM

## 2025-05-23 DIAGNOSIS — Z79.4 TYPE 2 DIABETES MELLITUS WITH DIABETIC NEPHROPATHY, WITH LONG-TERM CURRENT USE OF INSULIN (HCC): ICD-10-CM

## 2025-05-23 DIAGNOSIS — Z51.5 ENCOUNTER FOR PALLIATIVE CARE: ICD-10-CM

## 2025-05-23 DIAGNOSIS — Z71.89 GOALS OF CARE, COUNSELING/DISCUSSION: ICD-10-CM

## 2025-05-23 DIAGNOSIS — E11.21 TYPE 2 DIABETES MELLITUS WITH DIABETIC NEPHROPATHY, WITH LONG-TERM CURRENT USE OF INSULIN (HCC): ICD-10-CM

## 2025-05-23 DIAGNOSIS — R29.6 FREQUENT FALLS: ICD-10-CM

## 2025-05-23 PROCEDURE — 1123F ACP DISCUSS/DSCN MKR DOCD: CPT

## 2025-05-23 PROCEDURE — 3052F HG A1C>EQUAL 8.0%<EQUAL 9.0%: CPT

## 2025-05-23 PROCEDURE — 99350 HOME/RES VST EST HIGH MDM 60: CPT

## 2025-05-23 NOTE — PROGRESS NOTES
after his stroke she tells me that the patient fell for an online scam and lost all of his life savings.     Carolina demonstrates exceptional care of the patient.  She shows me detailed record keeping of meals and glucose readings.  She received Skype training several years back from the [facility] on managing his diabetes. She monitors his dexcom sensor and counts carbs and home makes all of his meals. Lately she has been having difficulty with low blood sugar readings, she is wondering if this is related to the physical therapy and increased activity that he had been participating in.     In general, she tells me that Eliseo is very easygoing and not demanding, no reports of agitation, she is concerned for his mental status as he is very self secluding and she feels that he is depressed. She tells me he listens to audiobooks, eats meals with good appetite, has strong tremors and is embarrassed to eat in public.  She feels that he has lost his senses of humor and empathy, it is difficult for her to see his personality change as disease progresses.    She expresses concern for his recent decline in function.  The patient has had repeat falls (no injuries) requiring her to call the neighbors to help her pick him up.  He has been working with physical therapy at home, which she feels is exhausting him and increasing his fatigue and generalized weakness after sessions.  He has a walker at home but is forgetful to use it every time and it is difficult to maneuver for him.  He gets up from the bed at night and has been known to wander the house. He is sleeping majority of the day.  She has difficulty getting enough sleep because she is hearing him call out nonsensically in the evenings and has to check on him frequently.     Carolina is very concerned about possibly leaving patient in respite care at [facility] living [facility] for an upcoming reunion trip to see family in [place] that she has not seen in 11 years this

## 2025-05-29 DIAGNOSIS — E11.21 TYPE 2 DIABETES MELLITUS WITH DIABETIC NEPHROPATHY, WITH LONG-TERM CURRENT USE OF INSULIN (HCC): ICD-10-CM

## 2025-05-29 DIAGNOSIS — Z79.4 TYPE 2 DIABETES MELLITUS WITH DIABETIC NEPHROPATHY, WITH LONG-TERM CURRENT USE OF INSULIN (HCC): ICD-10-CM

## 2025-05-30 ENCOUNTER — OFFICE VISIT (OUTPATIENT)
Dept: INTERNAL MEDICINE | Age: 80
End: 2025-05-30
Payer: COMMERCIAL

## 2025-05-30 ENCOUNTER — CARE COORDINATION (OUTPATIENT)
Dept: CARE COORDINATION | Age: 80
End: 2025-05-30

## 2025-05-30 ENCOUNTER — OFFICE VISIT (OUTPATIENT)
Dept: PALLATIVE CARE | Age: 80
End: 2025-05-30
Payer: COMMERCIAL

## 2025-05-30 VITALS
DIASTOLIC BLOOD PRESSURE: 78 MMHG | SYSTOLIC BLOOD PRESSURE: 120 MMHG | OXYGEN SATURATION: 98 % | BODY MASS INDEX: 31.01 KG/M2 | HEART RATE: 92 BPM | WEIGHT: 234 LBS | HEIGHT: 73 IN

## 2025-05-30 DIAGNOSIS — E11.21 TYPE 2 DIABETES MELLITUS WITH DIABETIC NEPHROPATHY, WITH LONG-TERM CURRENT USE OF INSULIN (HCC): ICD-10-CM

## 2025-05-30 DIAGNOSIS — G47.9 DISTURBANCE IN SLEEP BEHAVIOR: ICD-10-CM

## 2025-05-30 DIAGNOSIS — F32.A DEPRESSIVE DISORDER: ICD-10-CM

## 2025-05-30 DIAGNOSIS — Z51.5 ENCOUNTER FOR PALLIATIVE CARE: ICD-10-CM

## 2025-05-30 DIAGNOSIS — Z79.4 TYPE 2 DIABETES MELLITUS WITH DIABETIC NEPHROPATHY, WITH LONG-TERM CURRENT USE OF INSULIN (HCC): ICD-10-CM

## 2025-05-30 DIAGNOSIS — G21.4 VASCULAR PARKINSONISM (HCC): ICD-10-CM

## 2025-05-30 DIAGNOSIS — F01.C3 SEVERE VASCULAR DEMENTIA WITH MOOD DISTURBANCE (HCC): Primary | ICD-10-CM

## 2025-05-30 DIAGNOSIS — Z71.89 ENCOUNTER FOR SUPPORT TO CAREGIVER: ICD-10-CM

## 2025-05-30 DIAGNOSIS — I10 PRIMARY HYPERTENSION: ICD-10-CM

## 2025-05-30 DIAGNOSIS — R25.1 TREMOR: ICD-10-CM

## 2025-05-30 DIAGNOSIS — F01.C0 SEVERE VASCULAR DEMENTIA WITHOUT BEHAVIORAL DISTURBANCE, PSYCHOTIC DISTURBANCE, MOOD DISTURBANCE, OR ANXIETY (HCC): Primary | ICD-10-CM

## 2025-05-30 PROCEDURE — 99350 HOME/RES VST EST HIGH MDM 60: CPT

## 2025-05-30 PROCEDURE — 3052F HG A1C>EQUAL 8.0%<EQUAL 9.0%: CPT | Performed by: INTERNAL MEDICINE

## 2025-05-30 PROCEDURE — 1123F ACP DISCUSS/DSCN MKR DOCD: CPT

## 2025-05-30 PROCEDURE — 3052F HG A1C>EQUAL 8.0%<EQUAL 9.0%: CPT

## 2025-05-30 PROCEDURE — 3078F DIAST BP <80 MM HG: CPT | Performed by: INTERNAL MEDICINE

## 2025-05-30 PROCEDURE — 99214 OFFICE O/P EST MOD 30 MIN: CPT | Performed by: INTERNAL MEDICINE

## 2025-05-30 PROCEDURE — 3074F SYST BP LT 130 MM HG: CPT | Performed by: INTERNAL MEDICINE

## 2025-05-30 PROCEDURE — 1123F ACP DISCUSS/DSCN MKR DOCD: CPT | Performed by: INTERNAL MEDICINE

## 2025-05-30 RX ORDER — ACYCLOVIR 400 MG/1
TABLET ORAL
Qty: 9 EACH | Refills: 3 | Status: SHIPPED | OUTPATIENT
Start: 2025-05-30

## 2025-05-30 RX ORDER — ESCITALOPRAM OXALATE 20 MG/1
10 TABLET ORAL DAILY
Qty: 45 TABLET | Refills: 3
Start: 2025-05-30

## 2025-05-30 NOTE — PROGRESS NOTES
M/uL    Hemoglobin 14.3 14.0 - 18.0 g/dL    Hematocrit 44.8 42.0 - 52.0 %    .1 (H) 80.0 - 94.0 fL    MCH 32.6 (H) 27.0 - 31.0 pg    MCHC 31.9 (L) 33.0 - 37.0 g/dL    RDW 13.2 11.5 - 14.5 %    Platelets 182 130 - 400 K/uL    MPV 11.5 9.4 - 12.4 fL       ASSESSMENT/ PLAN:  1. Severe vascular dementia without behavioral disturbance, psychotic disturbance, mood disturbance, or anxiety (HCC) - worsening Nocturnal vocalizations: Chronic. Currently on Lexapro (escitalopram) 20 mg.  - Reduce Lexapro dosage to 10 mg by halving 20 mg tablets.  - Consult Dr. Mir Velazquez regarding potential addition of Seroquel or Rexulti.  2. Depressive disorder- seems very sad but decrease lexapro and later add seroquel or simlar therapy   -     escitalopram (LEXAPRO) 20 MG tablet; Take 0.5 tablets by mouth daily, Disp-45 tablet, R-3NO PRINT  3. Vascular parkinsonism (HCC)Tremors: Chronic. Diagnosed with parkinsonism.- Increase carbidopa-levodopa dosage under Dr. Mir Velazquez's guidance.  4. Type 2 diabetes mellitus with diabetic nephropathy, with long-term current use of insulin ([facility]) Diabetes mellitus: Chronic. Last recorded A1c level was 8, elevated but acceptable given age and other health conditions.  - Adjust Dexcom glucose meter threshold to 350 to prioritize sleep quality.  - Lab order for A1c testing issued, valid until 02/2026.  - Advised to undergo tests at convenience.  5. Primary hypertension - stable and follow      Assessment & Plan              Follow-up  - Follow up in 4 months.                The patient (or guardian, if applicable) and other individuals in attendance with the patient were advised that Artificial Intelligence will be utilized during this visit to record, process the conversation to generate a clinical note, and support improvement of the AI technology. The patient (or guardian, if applicable) and other individuals in attendance at the appointment consented to the use of AI, including the recording.

## 2025-05-30 NOTE — PROGRESS NOTES
Supportive Care/Community Based Palliative Care  Follow Up Note      Patient Name:  Eliseo Byrd  Medical Record Number:  [id number]  YOB: 1945    Date of Visit: 5/30/2025  Location of Visit:  home    Patient Care Team:  Adore Thomas MD as PCP - General (Internal Medicine)  Adore Thomas MD as PCP - Empaneled Provider  Angie Steward PA as Neurologist (Physician Assistant Medical)  Guillermina Denis RN as Ambulatory Care Manager  Margoth Claros RN as Ambulatory Care Manager  Angie Lucas APRN - CNP as Nurse Practitioner (Hospice and Palliative Medicine)    Reason for Consult:   Goals of care   Symptom Management    ACP  Family Support  Patient Support    History obtained from:  Significant other - ex-Wife/friend and POGOVIND Figueroa, electronic medical record    CHIEF CONCERN:     Chief Complaint   Patient presents with    Follow-up     Sleep disturbance         CLINICAL SUMMARY AND HISTORY:     Eliseo Byrd is a 79 y.o. male with PMH of vascular dementia, Alzheimer’s, diabetes, CKD III, CVA, hearing loss, HTN, HLD, sleep apnea requiring BiPAP, depression. He lives with his ex-wife/friend Carolina, who is his POA and primary caregiver. He has had recent repeat falls despite home PT and decreased energy. Palliative care has been consulted for goals of care, symptom management, and patient and family support.    Past Medical History:        Diagnosis Date    BiPAP (biphasic positive airway pressure) dependence     Cerebrovascular disease 2017    Chronic kidney disease 2004    Depression     Diabetes (HCC)     Hearing loss     Hyperlipidemia     Hypersomnia     Hypertension     Mild cognitive impairment     MARYANA (obstructive sleep apnea)     Osteoarthritis     Stage 3 chronic kidney disease (HCC)     Stroke (HCC)     Type 2 diabetes mellitus without complication (HCC) 1998    Vascular disease        Past Surgical History:    Past Surgical History:   Procedure Laterality Date    EYE SURGERY  2018    Cataracts

## 2025-05-31 NOTE — CARE COORDINATION
Ambulatory Care Coordination Note     2025 1:58 PM     Patient Current Location:  [place]     ACM contacted the  Patient and caregiver  during office visit on 25. Verified name and  with caregiver and staff  as identifiers.         ACM: Margoth Claros RN     Challenges to be reviewed by the provider   Additional needs identified to be addressed with provider No               Method of communication with provider:  spoke with PCP prior to meeting with patient and caregiver face to face .    Utilization: Patient has not had any utilization since our last call.     Care Summary Note: Patient in office for scheduled follow up, accompanied by full time caregiver/friend, Carolina. Patient is overall stable for condition, does have some s/s of worsening cognitive deficit with some evening vocalization and restlessness per CG. CG has met with Palliative Care and is anticipating another visit with the MSW. CG is pleased with this service/support and feels some relief of caregiver stress. Pt may consider ending outpatient PT as this seems to be exhausting patient rather than improving stamina. CG has no additional concerns or questions with ACM today during office visit. She is agreeable to follow up call in 2 weeks.    Offered patient enrollment in the Remote Patient Monitoring (RPM) program for in-home monitoring: Yes, but did not enroll at this time: already monitoring with home equipment.     Assessments Completed:   Care Coordination Interventions    Referral from Primary Care Provider: No  Suggested Interventions and Community Resources  Meals on Wheels: Completed (Comment: CG aware of [facility] meals, plans to resume for patient)  Palliative Care: Completed (Comment: provider established 2025)  Physical Therapy: Completed (Comment: going to outpatient PT)  Social Work: In Process (Comment: Palliative Care MSW to plan home visit)  Transportation Support: Completed  Zone Management Tools: In Process (Comment:

## 2025-06-02 ENCOUNTER — SOCIAL WORK (OUTPATIENT)
Dept: PALLATIVE CARE | Age: 80
End: 2025-06-02

## 2025-06-02 NOTE — PROGRESS NOTES
I was asked to see Eliseo Byrd by CRUZITO Medina for [facility].  Upon arrival, his ex wife and caregiver, Carolina Holman (Pronounced Betty), met me at the door.  We sat in the kitchen to discuss their situation while Eliseo was resting in his bedroom.  Eliseo and Carolina met while he was visiting [place] where Carolina is from.  She graduated high school and enrolled in college in [place] and they maintained a friendship.  They eventually  and remained so for close to 30 plus years.  Eliseo was very scientific minded and not very social.  He worked for the [employer].  Carolina was a research  and worked for [employer] and later she began to travel more for another job.    She and Eliseo  in 2017 and he was remarried briefly and moved to [place].  Carolina states that in spite of some deception she looks fondly on their marital years and feels a strong connection and devotion to providing his care.  She wants to go to [place] and see her family and would need to be gone approx 1 month. She is hoping to do this in September but wonders about options.  We discussed their finances to see what realistic options we have.  Since they are not , their assets are not viewed together.  Eliseo has a net income of approx $2650 from social security and $670 from a pension.  He has approx $22,000 in a bank account.  He pays Carolina $900 in [employer].  He has a life insurance policy that costs $1000 per month that is taken out of his pension (his pension gross amount is $1700 approx).  There is no cash value to the life insurance policy.  She has spoken to [facility] Law office when they were  re: protecting her assets.  I suggested that she speak to him again with care of Eliseo in mind.  Because his income is in excess of medicaid limits she may need to establish a QIT trust and an  can better guide her on how to do this.  We called Tiffanie Bryant in [place] re; cost of a 6 week stay and it would be

## 2025-06-05 ENCOUNTER — OFFICE VISIT (OUTPATIENT)
Dept: NEUROLOGY | Age: 80
End: 2025-06-05
Payer: COMMERCIAL

## 2025-06-05 VITALS
WEIGHT: 234 LBS | OXYGEN SATURATION: 96 % | HEIGHT: 73 IN | DIASTOLIC BLOOD PRESSURE: 68 MMHG | RESPIRATION RATE: 16 BRPM | BODY MASS INDEX: 31.01 KG/M2 | HEART RATE: 89 BPM | SYSTOLIC BLOOD PRESSURE: 126 MMHG

## 2025-06-05 DIAGNOSIS — F01.C0 SEVERE VASCULAR DEMENTIA WITHOUT BEHAVIORAL DISTURBANCE, PSYCHOTIC DISTURBANCE, MOOD DISTURBANCE, OR ANXIETY (HCC): Primary | ICD-10-CM

## 2025-06-05 DIAGNOSIS — G21.4 VASCULAR PARKINSONISM (HCC): ICD-10-CM

## 2025-06-05 DIAGNOSIS — G47.33 OBSTRUCTIVE SLEEP APNEA: ICD-10-CM

## 2025-06-05 DIAGNOSIS — G47.52 REM SLEEP BEHAVIOR DISORDER: ICD-10-CM

## 2025-06-05 DIAGNOSIS — Z78.9 DIFFICULTY USING BIPHASIC POSITIVE AIRWAY PRESSURE (BIPAP) MACHINE: ICD-10-CM

## 2025-06-05 PROCEDURE — 1159F MED LIST DOCD IN RCRD: CPT | Performed by: PSYCHIATRY & NEUROLOGY

## 2025-06-05 PROCEDURE — 3078F DIAST BP <80 MM HG: CPT | Performed by: PSYCHIATRY & NEUROLOGY

## 2025-06-05 PROCEDURE — 3074F SYST BP LT 130 MM HG: CPT | Performed by: PSYCHIATRY & NEUROLOGY

## 2025-06-05 PROCEDURE — 1123F ACP DISCUSS/DSCN MKR DOCD: CPT | Performed by: PSYCHIATRY & NEUROLOGY

## 2025-06-05 PROCEDURE — 99214 OFFICE O/P EST MOD 30 MIN: CPT | Performed by: PSYCHIATRY & NEUROLOGY

## 2025-06-05 RX ORDER — MEMANTINE HYDROCHLORIDE 5 MG/1
5 TABLET ORAL 2 TIMES DAILY
Qty: 180 TABLET | Refills: 1 | Status: SHIPPED | OUTPATIENT
Start: 2025-06-05

## 2025-06-05 NOTE — PROGRESS NOTES
Known Problems Father     Obesity Brother     No Known Problems Maternal Grandmother     No Known Problems Maternal Grandfather     No Known Problems Paternal Grandmother     No Known Problems Paternal Grandfather        Social History  Eliseo Byrd is , lives in [place], [place], and is retired.  He has a caretaker and an ex wife that are with him most of the time.  He does not drive.     Medications:  Current Outpatient Medications   Medication Sig Dispense Refill    Continuous Glucose Sensor (DEXCOM G7 SENSOR) MISC USE AS DIRECTED AND CHANGE EVERY 10 DAYS 9 each 3    escitalopram (LEXAPRO) 20 MG tablet Take 0.5 tablets by mouth daily 45 tablet 3    Cholecalciferol (VITAMIN D3) 1.25 MG (10908 UT) CAPS Take 1 capsule by mouth once a week      rivaroxaban (XARELTO) 15 MG TABS tablet Take 1 tablet by mouth daily (with breakfast) Cancel the 20mg tablet 90 tablet 1    insulin aspart (NOVOLOG FLEXPEN) 100 UNIT/ML injection pen 4 tid as needed units if bs above 220 (Patient taking differently: 4 tid as needed units if bs above 220. CG trying to titrate dose with readings below 220) 5 Adjustable Dose Pre-filled Pen Syringe 3    Insulin Degludec (TRESIBA FLEXTOUCH) 200 UNIT/ML SOPN Inject 18 Units into the skin at bedtime (Patient taking differently: Inject 18 Units into the skin at bedtime Dose varies if evening glucose check is low. Sometimes 14 units nightly) 9 Adjustable Dose Pre-filled Pen Syringe 3    donepezil (ARICEPT) 10 MG tablet Take 1 tablet by mouth nightly 90 tablet 3    atorvastatin (LIPITOR) 40 MG tablet Take 1 tablet by mouth daily 90 tablet 3    carbidopa-levodopa (SINEMET)  MG per tablet Take 2 tablets by mouth 3 times daily (Patient taking differently: Take 2 tablets by mouth 3 times daily Pt takes med usually twice a day) 180 tablet 5    losartan (COZAAR) 50 MG tablet Take 1 tablet by mouth daily 90 tablet 3    levothyroxine (SYNTHROID) 100 MCG tablet Take 1 tablet by mouth daily 90 tablet 3

## 2025-06-05 NOTE — PATIENT INSTRUCTIONS
INSTRUCTIONS:  1. Take the Lexapro (escitalopram) at night.  This may reduce the active dream episodes.  2. Start Namenda (donepezil) 5 mg twice a day

## 2025-06-13 ENCOUNTER — CARE COORDINATION (OUTPATIENT)
Dept: CARE COORDINATION | Age: 80
End: 2025-06-13

## 2025-06-13 NOTE — CARE COORDINATION
ACDEANGELO spoke briefly to patient's caregiver, Carolina. She was not able to have a conversation with DASHAWN today as she was running an errand and not home. We agreed to have a call next week.  Electronically signed by Margoth Claros RN on 6/13/2025 at 5:03 PM

## 2025-06-17 ENCOUNTER — CARE COORDINATION (OUTPATIENT)
Dept: CARE COORDINATION | Age: 80
End: 2025-06-17

## 2025-06-17 NOTE — ACP (ADVANCE CARE PLANNING)
Advance Care Planning   The patient has the following advanced directives on file:  Advance Directives       Power of  Living Will ACP-Advance Directive ACP-Power of     Not on File Filed on 12/26/23 Filed Not on File            The patient has appointed the following active healthcare agents:    Primary Decision Maker: carolina krishnan Sara Siegel - [phone]    The Patient has the following current code status:    Code Status: Not on file    Visit Documentation:  I called and spoke with Caregiver/POA, Carolina Tyrellasa, regarding Advance Care Planning and Advance Care Directives, including Living Will and Health Care Power of .     I discussed Advance Care Planning with Carolina Krishnan today which included the patient's choices for care and treatment in the case of a health event that adversely affects decision-making abilities. The Advance Care Directives on file are current. We discussed patient's current values, goals and care preferences at the end of life.  Caregiver/POA has no questions at this time and has agreed to keep me up-to-date should anything change.     Margoth Claros RN  6/17/2025

## 2025-06-17 NOTE — CARE COORDINATION
Ambulatory Care Coordination Note     2025 3:02 PM     Patient Current Location:  Home: [address] [facility] [address]     ACM contacted the caregiver by telephone. Verified name and  with caregiver as identifiers.     Patient closed (caregiver demonstrates engagement with care team and managing chronic conditions) from the High Risk Care Management program on 2025.  Caregiver verbalizes confidence in the ability to self-manage at this time. has the ability to self-manage at this time..  Care management goals have been completed. No further Ambulatory Care Manager follow up scheduled.      ACM: Margoth Claros RN     Challenges to be reviewed by the provider   Additional needs identified to be addressed with provider No               Method of communication with provider: none.    Utilization: Patient has not had any utilization since our last call.     Care Summary Note: Caregiver/POA reported she has had good visits with Palliative Care personnel and is very happy with the support and information they have provided. Patient had a Neurology appointment earlier this month and CG said Neuro believes pt may need skilled care within the year. CG tried [facility] but this did not seem very good so she has discontinued the service. Patient had a fall, also an episode of being soiled with stool and yet unaware he had had a BM. Patient has lost interest in TV which had been a source of entertainment to him. CG is contemplative about planning for next phase of care for patient, stated she has not reached out to local law firm but does have a contact/friend who is a  elsewhere that she is considering discussing patient's financial needs with. CG remains reluctant to place patient in respite care, feels it would be too distressing to pt to do that if he is going to need placement in the next 6 months. ACM encouraged CG to contact Palliative Care if she would like to discuss any questions about planning for

## 2025-06-26 ENCOUNTER — OFFICE VISIT (OUTPATIENT)
Age: 80
End: 2025-06-26
Payer: COMMERCIAL

## 2025-06-26 VITALS
OXYGEN SATURATION: 97 % | DIASTOLIC BLOOD PRESSURE: 68 MMHG | HEIGHT: 73 IN | HEART RATE: 77 BPM | BODY MASS INDEX: 30.62 KG/M2 | WEIGHT: 231 LBS | SYSTOLIC BLOOD PRESSURE: 116 MMHG

## 2025-06-26 DIAGNOSIS — E11.22 TYPE 2 DIABETES MELLITUS WITH CHRONIC KIDNEY DISEASE, WITH LONG-TERM CURRENT USE OF INSULIN, UNSPECIFIED CKD STAGE: ICD-10-CM

## 2025-06-26 DIAGNOSIS — R26.9 GAIT ABNORMALITY: ICD-10-CM

## 2025-06-26 DIAGNOSIS — N18.30 HYPERTENSIVE KIDNEY DISEASE, STAGE III: ICD-10-CM

## 2025-06-26 DIAGNOSIS — I12.9 HYPERTENSIVE KIDNEY DISEASE, STAGE III: ICD-10-CM

## 2025-06-26 DIAGNOSIS — B35.1 ONYCHOMYCOSIS: ICD-10-CM

## 2025-06-26 DIAGNOSIS — L60.2 ONYCHOGRYPHOSIS: Primary | ICD-10-CM

## 2025-06-26 DIAGNOSIS — E11.9 ENCOUNTER FOR DIABETIC FOOT EXAM: ICD-10-CM

## 2025-06-26 DIAGNOSIS — Z79.4 TYPE 2 DIABETES MELLITUS WITH CHRONIC KIDNEY DISEASE, WITH LONG-TERM CURRENT USE OF INSULIN, UNSPECIFIED CKD STAGE: ICD-10-CM

## 2025-06-26 DIAGNOSIS — B35.3 TINEA PEDIS OF BOTH FEET: ICD-10-CM

## 2025-06-26 NOTE — PROGRESS NOTES
[facility] - PODIATRY    Today's Date: 06/26/25    Patient Name: Haseeb Joshua  MRN: [id number]  CSN: [id number]  PCP: Kyara Sahu MD  Referring Provider: No ref. provider found    SUBJECTIVE     Chief Complaint   Patient presents with    Follow-up     PCP: Kyara Sahu MD 05/30/25  DIABETES CHECK UP- NO IMAGING- NO HX OF SX   Pt states he is here today for diabetic foot/nail care. Pt denies pain. Pt is ambulating with walker.     Diabetes     Didn't check today.      HPI: Haseeb Joshua, a 79 y.o.male, comes to clinic as a(n) established patient presenting for diabetic foot exam and complaining of painful toenails. Patient has h/o CKD, diabetes, DVT, hypertension, hypothyroidism, CVA. Patient is IDDM and unsure of last BG level.  Unsure of last A1c.  Here today for routine DFC for thickened, irregular, lengthy toenails.  Relates it has been since last October since his nails have been trimmed.  Admits to difficulty providing care for himself. Denies significant numbness or tingling in feet.  Denies open wounds or sores today.  Utilizing walker for ambulatory support today.  Denies pain. Relates previous treatment(s) including care by podiatry. Denies any constitutional symptoms. No other pedal complaints at this time.    Past Medical History:   Diagnosis Date    Chronic kidney disease     stage 3    Diabetes mellitus     DVT (deep venous thrombosis)     Hypertension     Hypothyroidism     Parkinsonism     Stroke 09/09/2016    PCA distribution stroke, left     History reviewed. No pertinent surgical history.  Family History   Problem Relation Age of Onset    Diabetes Mother     Diabetes Brother      Social History     Socioeconomic History    Marital status:    Tobacco Use    Smoking status: Never     Passive exposure: Never    Smokeless tobacco: Never   Vaping Use    Vaping status: Never Used   Substance and Sexual Activity    Alcohol use: No    Drug use: Never    Sexual activity: Not  Currently     Partners: Female     Allergies   Allergen Reactions    Latex Itching     possible    Lisinopril Cough     Current Outpatient Medications   Medication Sig Dispense Refill    ACCU-CHEK ANITRA PLUS test strip       atorvastatin (LIPITOR) 10 MG tablet Take 1 tablet by mouth Daily.      calcitriol (ROCALTROL) 0.25 MCG capsule Take 1 capsule by mouth Daily.      levothyroxine (SYNTHROID, LEVOTHROID) 100 MCG tablet Take 1 tablet by mouth Daily.      lisinopril (PRINIVIL,ZESTRIL) 20 MG tablet Take 1 tablet by mouth Daily.      losartan (COZAAR) 100 MG tablet       lovastatin (MEVACOR) 20 MG tablet       NovoLOG FlexPen 100 UNIT/ML solution pen-injector sc pen       rivaroxaban (XARELTO) 20 MG tablet Take 1 tablet by mouth Daily.       No current facility-administered medications for this visit.     Review of Systems   Constitutional:  Negative for chills and fever.   HENT:  Negative for congestion.    Respiratory:  Negative for shortness of breath.    Cardiovascular:  Negative for chest pain and leg swelling.   Gastrointestinal:  Negative for constipation, diarrhea, nausea and vomiting.   Musculoskeletal:  Positive for gait problem. Negative for arthralgias and myalgias.   Skin:  Negative for wound.        Thickened nails   Neurological:  Negative for numbness.   Hematological:  Does not bruise/bleed easily.       OBJECTIVE     Vitals:    06/26/25 1031   BP: 116/68   Pulse: 77   SpO2: 97%             PHYSICAL EXAM  GEN:   Accompanied by none.     Foot/Ankle Exam    GENERAL  Diabetic foot exam performed    Appearance:  appears stated age  Orientation:  AAOx3  Affect:  appropriate  Gait:  shuffling  Assistance:  walker  Right shoe gear: casual shoe  Left shoe gear: casual shoe    VASCULAR     Right Foot Vascularity   Dorsalis pedis:  2+  Posterior tibial:  2+  Skin temperature:  warm  Edema grading:  None  CFT:  3  Pedal hair growth:  Present  Varicosities:  none     Left Foot Vascularity   Dorsalis pedis:   2+  Posterior tibial:  2+  Skin temperature:  warm  Edema grading:  None  CFT:  3  Pedal hair growth:  Present  Varicosities:  none     NEUROLOGIC     Right Foot Neurologic   Normal sensation    Light touch sensation: normal  Vibratory sensation: normal  Hot/Cold sensation: normal  Protective Sensation using [place] Monofilament:   Sites intact: 10  Sites tested: 10     Left Foot Neurologic   Normal sensation    Light touch sensation: normal  Vibratory sensation: normal  Hot/Cold sensation:  normal  Protective Sensation using [place] Monofilament:   Sites intact: 10  Sites tested: 10    MUSCULOSKELETAL     Right Foot Musculoskeletal   Ecchymosis:  none  Tenderness:  none    Arch:  Normal     Left Foot Musculoskeletal   Ecchymosis:  none  Tenderness:  none  Arch:  Normal    MUSCLE STRENGTH     Right Foot Muscle Strength   Foot dorsiflexion:  5  Foot plantar flexion:  5  Foot inversion:  5  Foot eversion:  5     Left Foot Muscle Strength   Foot dorsiflexion:  5  Foot plantar flexion:  5  Foot inversion:  5  Foot eversion:  5    RANGE OF MOTION     Right Foot Range of Motion   Foot and ankle ROM within normal limits       Left Foot Range of Motion   Foot and ankle ROM within normal limits      DERMATOLOGIC      Right Foot Dermatologic   Skin  Positive for tinea.   Nails  1.  Positive for elongated and abnormal thickness.  2.  Positive for elongated and abnormal thickness.  3.  Positive for elongated and abnormal thickness.  4.  Positive for elongated and abnormal thickness.  5.  Positive for elongated and abnormal thickness.     Left Foot Dermatologic   Skin  Positive for tinea.   Nails comment:  Onychogryphosis of hallux  Nails  1.  Positive for onychomycosis, abnormal thickness, subungual debris and dystrophic nail.  2.  Positive for elongated and abnormal thickness.  3.  Positive for elongated, onychomycosis, abnormal thickness and dystrophic nail.  4.  Positive for elongated and abnormally  thick.  5.  Positive for elongated.     Right foot additional comments: Interdigital tinea pedis noted.      Left foot additional comments: Interdigital tinea pedis noted.       RADIOLOGY/NUCLEAR:  No results found.    LABORATORY/CULTURE RESULTS:      PATHOLOGY RESULTS:       ASSESSMENT/PLAN     Diagnoses and all orders for this visit:    1. Onychogryphosis (Primary)    2. Onychomycosis    3. Type 2 diabetes mellitus with chronic kidney disease, with long-term current use of insulin, unspecified CKD stage    4. Encounter for diabetic foot exam    5. Hypertensive kidney disease, stage III    6. Tinea pedis of both feet    7. Gait abnormality          Comprehensive lower extremity examination and evaluation was performed.  Discussed findings and treatment plan including risks, benefits, and treatment options with patient in detail. Patient agreed with treatment plan.  DFE performed today.  Last A1c for review 8%.  Reviewed PCP notes.  Control management type II DM to be continued per PCP  After verbal consent obtained, nail(s) x10 debrided of length and thickness with nail nipper without incidence  Patient may maintain nails and calluses at home utilizing emery board or pumice stone between visits as needed  Reviewed at home diabetic foot care including daily foot checks   InterDigital tinea pedis persists. Offered to send in prescription antifungal or patient could use topical.  Prefers to use over-the-counter products.  Structured to apply antifungal cream twice daily for the next 3 to 4 weeks.  Instructed to keep feet dry, dry thoroughly between toes, wear moisture wicking socks and to allow shoes to dry between wear if become wet or damp.  Patient scheduled to return in 6 months per his preference.  Encouraged patient to call sooner with questions or concerns.    An After Visit Summary was printed and given to the patient at discharge, including (if requested) any available informative/educational handouts  regarding diagnosis, treatment, or medications. All questions were answered to patient/family satisfaction. Should symptoms fail to improve or worsen they agree to call or return to clinic or to go to the Emergency Department. Discussed the importance of following up with any needed screening tests/labs/specialist appointments and any requested follow-up recommended by me today. Importance of maintaining follow-up discussed and patient accepts that missed appointments can delay diagnosis and potentially lead to worsening of conditions.  Return in about 6 months (around 12/26/2025) for Follow-up with [facility], Follow-up in [facility] or sooner if acute issues arise.        This document has been electronically signed by SANDRA Flores on June 26, 2025 13:06 CDT

## 2025-07-03 ENCOUNTER — TELEPHONE (OUTPATIENT)
Dept: INTERNAL MEDICINE | Age: 80
End: 2025-07-03

## 2025-07-03 NOTE — TELEPHONE ENCOUNTER
He has had a nose bleed 3 times this week and wants to know if this could be due to some of his medicines?

## 2025-07-07 RX ORDER — FOLIC ACID 1 MG/1
1 TABLET ORAL DAILY
Qty: 90 TABLET | Refills: 2 | Status: SHIPPED | OUTPATIENT
Start: 2025-07-07

## 2025-07-07 NOTE — TELEPHONE ENCOUNTER
I had tried to call last week also and I left a message- can you call and make sure nosebleeds gone and if able to go back on eliquis-- if still with nose bleeds I will refer to ent to see if can cauterize anything

## 2025-07-24 DIAGNOSIS — E03.8 OTHER SPECIFIED HYPOTHYROIDISM: ICD-10-CM

## 2025-07-24 RX ORDER — LEVOTHYROXINE SODIUM 100 UG/1
100 TABLET ORAL DAILY
Qty: 90 TABLET | Refills: 3 | Status: SHIPPED | OUTPATIENT
Start: 2025-07-24

## 2025-07-30 ENCOUNTER — SOCIAL WORK (OUTPATIENT)
Dept: PALLATIVE CARE | Age: 80
End: 2025-07-30

## 2025-07-30 NOTE — PROGRESS NOTES
Today I had a follow up visit at the home of Eliseo Byrd and his ex wife/caregiver Betty.  Betty greeted me at the door and we sat in the dining area for this visit.  Eliseo was resting in the bedroom and was not part of the visit.  Betty shares that they met with neurology and they felt that he had further declined leading her to postpone her trip to [place].  We discussed this at length and she would like a visit with [facility] later this month to discuss his condition and what to expect.  I notified Angie of this request via email.  Betty is working to make some life decisions for her own long term plans and keeping Eliseo's needs in mind create added difficulty  in this.  She has potential to need a short term respite stay in a few weeks.  I called [facility] to confirm they could still do this and at this time they have bed availability.  Betty is going to update me next week re: this need and I will call [facility] to refer as appropriate.  I will follow up re: this in the coming days and continue to follow for support.

## 2025-08-04 ENCOUNTER — OFFICE VISIT (OUTPATIENT)
Dept: PALLATIVE CARE | Age: 80
End: 2025-08-04
Payer: COMMERCIAL

## 2025-08-04 DIAGNOSIS — R53.1 GENERALIZED WEAKNESS: ICD-10-CM

## 2025-08-04 DIAGNOSIS — Z78.9 IMPAIRED MOBILITY AND ADLS: ICD-10-CM

## 2025-08-04 DIAGNOSIS — Z51.5 ENCOUNTER FOR PALLIATIVE CARE: ICD-10-CM

## 2025-08-04 DIAGNOSIS — Z71.89 ENCOUNTER FOR SUPPORT TO CAREGIVER: ICD-10-CM

## 2025-08-04 DIAGNOSIS — R29.6 FREQUENT FALLS: ICD-10-CM

## 2025-08-04 DIAGNOSIS — F01.C3 SEVERE VASCULAR DEMENTIA WITH MOOD DISTURBANCE (HCC): Primary | ICD-10-CM

## 2025-08-04 DIAGNOSIS — Z74.09 IMPAIRED MOBILITY AND ADLS: ICD-10-CM

## 2025-08-04 PROCEDURE — 1123F ACP DISCUSS/DSCN MKR DOCD: CPT

## 2025-08-04 PROCEDURE — 99350 HOME/RES VST EST HIGH MDM 60: CPT

## 2025-08-22 ENCOUNTER — TELEPHONE (OUTPATIENT)
Dept: PALLATIVE CARE | Age: 80
End: 2025-08-22